# Patient Record
Sex: FEMALE | Race: WHITE | NOT HISPANIC OR LATINO
[De-identification: names, ages, dates, MRNs, and addresses within clinical notes are randomized per-mention and may not be internally consistent; named-entity substitution may affect disease eponyms.]

---
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
## 2023-01-03 ENCOUNTER — APPOINTMENT (OUTPATIENT)
Dept: PEDIATRICS | Facility: CLINIC | Age: 18
End: 2023-01-03
Payer: COMMERCIAL

## 2023-01-03 VITALS — TEMPERATURE: 97.8 F

## 2023-01-03 PROCEDURE — 99213 OFFICE O/P EST LOW 20 MIN: CPT

## 2023-01-03 NOTE — DISCUSSION/SUMMARY
[FreeTextEntry1] : May try zyrtec  and tylenol or motrin for pain as needed. Follow up PRN if sxs persist or worsen.

## 2023-01-03 NOTE — HISTORY OF PRESENT ILLNESS
[FreeTextEntry6] : Pt c/o R ear pain x 3 days\par denies fever, cough, congestion\par normal activity

## 2023-01-11 ENCOUNTER — EMERGENCY (EMERGENCY)
Facility: HOSPITAL | Age: 18
LOS: 0 days | Discharge: HOME | End: 2023-01-11
Attending: STUDENT IN AN ORGANIZED HEALTH CARE EDUCATION/TRAINING PROGRAM
Payer: COMMERCIAL

## 2023-01-11 VITALS — WEIGHT: 225.09 LBS | HEIGHT: 59 IN

## 2023-01-11 VITALS
OXYGEN SATURATION: 100 % | SYSTOLIC BLOOD PRESSURE: 120 MMHG | TEMPERATURE: 98 F | DIASTOLIC BLOOD PRESSURE: 70 MMHG | RESPIRATION RATE: 18 BRPM | HEART RATE: 85 BPM

## 2023-01-11 DIAGNOSIS — E66.9 OBESITY, UNSPECIFIED: ICD-10-CM

## 2023-01-11 DIAGNOSIS — D72.829 ELEVATED WHITE BLOOD CELL COUNT, UNSPECIFIED: ICD-10-CM

## 2023-01-11 DIAGNOSIS — R00.0 TACHYCARDIA, UNSPECIFIED: ICD-10-CM

## 2023-01-11 DIAGNOSIS — R07.89 OTHER CHEST PAIN: ICD-10-CM

## 2023-01-11 DIAGNOSIS — K21.9 GASTRO-ESOPHAGEAL REFLUX DISEASE WITHOUT ESOPHAGITIS: ICD-10-CM

## 2023-01-11 LAB
ALBUMIN SERPL ELPH-MCNC: 4 G/DL — SIGNIFICANT CHANGE UP (ref 3.3–5)
ALP SERPL-CCNC: 64 U/L — SIGNIFICANT CHANGE UP (ref 40–120)
ALT FLD-CCNC: 23 U/L — SIGNIFICANT CHANGE UP (ref 12–78)
ANION GAP SERPL CALC-SCNC: 9 MMOL/L — SIGNIFICANT CHANGE UP (ref 5–17)
AST SERPL-CCNC: 27 U/L — SIGNIFICANT CHANGE UP (ref 15–37)
BASOPHILS # BLD AUTO: 0.05 K/UL — SIGNIFICANT CHANGE UP (ref 0–0.2)
BASOPHILS NFR BLD AUTO: 0.3 % — SIGNIFICANT CHANGE UP (ref 0–2)
BILIRUB SERPL-MCNC: 0.3 MG/DL — SIGNIFICANT CHANGE UP (ref 0.2–1.2)
BUN SERPL-MCNC: 9 MG/DL — SIGNIFICANT CHANGE UP (ref 7–23)
CALCIUM SERPL-MCNC: 9.9 MG/DL — SIGNIFICANT CHANGE UP (ref 8.5–10.1)
CHLORIDE SERPL-SCNC: 106 MMOL/L — SIGNIFICANT CHANGE UP (ref 96–108)
CO2 SERPL-SCNC: 24 MMOL/L — SIGNIFICANT CHANGE UP (ref 22–31)
CREAT SERPL-MCNC: 0.74 MG/DL — SIGNIFICANT CHANGE UP (ref 0.5–1.3)
D DIMER BLD IA.RAPID-MCNC: 171 NG/ML DDU — SIGNIFICANT CHANGE UP
EGFR: 119 ML/MIN/1.73M2 — SIGNIFICANT CHANGE UP
EOSINOPHIL # BLD AUTO: 0.07 K/UL — SIGNIFICANT CHANGE UP (ref 0–0.5)
EOSINOPHIL NFR BLD AUTO: 0.5 % — SIGNIFICANT CHANGE UP (ref 0–6)
GLUCOSE SERPL-MCNC: 98 MG/DL — SIGNIFICANT CHANGE UP (ref 70–99)
HCG SERPL-ACNC: <1 MIU/ML — SIGNIFICANT CHANGE UP
HCT VFR BLD CALC: 39.1 % — SIGNIFICANT CHANGE UP (ref 34.5–45)
HGB BLD-MCNC: 12.1 G/DL — SIGNIFICANT CHANGE UP (ref 11.5–15.5)
IMM GRANULOCYTES NFR BLD AUTO: 0.3 % — SIGNIFICANT CHANGE UP (ref 0–0.9)
LIDOCAIN IGE QN: 103 U/L — SIGNIFICANT CHANGE UP (ref 73–393)
LYMPHOCYTES # BLD AUTO: 1.32 K/UL — SIGNIFICANT CHANGE UP (ref 1–3.3)
LYMPHOCYTES # BLD AUTO: 8.7 % — LOW (ref 13–44)
MCHC RBC-ENTMCNC: 23 PG — LOW (ref 27–34)
MCHC RBC-ENTMCNC: 30.9 GM/DL — LOW (ref 32–36)
MCV RBC AUTO: 74.2 FL — LOW (ref 80–100)
MONOCYTES # BLD AUTO: 0.41 K/UL — SIGNIFICANT CHANGE UP (ref 0–0.9)
MONOCYTES NFR BLD AUTO: 2.7 % — SIGNIFICANT CHANGE UP (ref 2–14)
NEUTROPHILS # BLD AUTO: 13.3 K/UL — HIGH (ref 1.8–7.4)
NEUTROPHILS NFR BLD AUTO: 87.5 % — HIGH (ref 43–77)
PLATELET # BLD AUTO: 438 K/UL — HIGH (ref 150–400)
POTASSIUM SERPL-MCNC: 4.2 MMOL/L — SIGNIFICANT CHANGE UP (ref 3.5–5.3)
POTASSIUM SERPL-SCNC: 4.2 MMOL/L — SIGNIFICANT CHANGE UP (ref 3.5–5.3)
PROT SERPL-MCNC: 9 GM/DL — HIGH (ref 6–8.3)
RBC # BLD: 5.27 M/UL — HIGH (ref 3.8–5.2)
RBC # FLD: 15.2 % — HIGH (ref 10.3–14.5)
SODIUM SERPL-SCNC: 139 MMOL/L — SIGNIFICANT CHANGE UP (ref 135–145)
TROPONIN I, HIGH SENSITIVITY RESULT: 3.68 NG/L — SIGNIFICANT CHANGE UP
WBC # BLD: 15.19 K/UL — HIGH (ref 3.8–10.5)
WBC # FLD AUTO: 15.19 K/UL — HIGH (ref 3.8–10.5)

## 2023-01-11 PROCEDURE — 85379 FIBRIN DEGRADATION QUANT: CPT

## 2023-01-11 PROCEDURE — 84702 CHORIONIC GONADOTROPIN TEST: CPT

## 2023-01-11 PROCEDURE — 83690 ASSAY OF LIPASE: CPT

## 2023-01-11 PROCEDURE — 71045 X-RAY EXAM CHEST 1 VIEW: CPT

## 2023-01-11 PROCEDURE — 99285 EMERGENCY DEPT VISIT HI MDM: CPT | Mod: 25

## 2023-01-11 PROCEDURE — 93010 ELECTROCARDIOGRAM REPORT: CPT

## 2023-01-11 PROCEDURE — 96374 THER/PROPH/DIAG INJ IV PUSH: CPT

## 2023-01-11 PROCEDURE — 85025 COMPLETE CBC W/AUTO DIFF WBC: CPT

## 2023-01-11 PROCEDURE — 99285 EMERGENCY DEPT VISIT HI MDM: CPT

## 2023-01-11 PROCEDURE — 36415 COLL VENOUS BLD VENIPUNCTURE: CPT

## 2023-01-11 PROCEDURE — 93005 ELECTROCARDIOGRAM TRACING: CPT

## 2023-01-11 PROCEDURE — 84484 ASSAY OF TROPONIN QUANT: CPT

## 2023-01-11 PROCEDURE — 80053 COMPREHEN METABOLIC PANEL: CPT

## 2023-01-11 PROCEDURE — 71045 X-RAY EXAM CHEST 1 VIEW: CPT | Mod: 26

## 2023-01-11 RX ORDER — FAMOTIDINE 10 MG/ML
1 INJECTION INTRAVENOUS
Qty: 14 | Refills: 0
Start: 2023-01-11 | End: 2023-01-17

## 2023-01-11 RX ORDER — FAMOTIDINE 10 MG/ML
20 INJECTION INTRAVENOUS ONCE
Refills: 0 | Status: COMPLETED | OUTPATIENT
Start: 2023-01-11 | End: 2023-01-11

## 2023-01-11 RX ADMIN — Medication 30 MILLILITER(S): at 18:54

## 2023-01-11 RX ADMIN — FAMOTIDINE 20 MILLIGRAM(S): 10 INJECTION INTRAVENOUS at 18:54

## 2023-01-11 NOTE — ED STATDOCS - NS ED ATTENDING STATEMENT MOD
This was a shared visit with the DAGOBERTO. I reviewed and verified the documentation and independently performed the documented:

## 2023-01-11 NOTE — ED STATDOCS - PROGRESS NOTE DETAILS
labs with mild leukocytosis, other labs WNL, trop and dimer negative, pt feeling well, pain resolved after GI cocktail, will dc home with pepcid prn, return precautions   Rosa M Duenas PA-C Patient seen and evaluated, ED attending note and orders reviewed, will continue with patient follow up and care -Rosa M Duenas PA-C

## 2023-01-11 NOTE — ED STATDOCS - OBJECTIVE STATEMENT
17 year old female with no PMHx presents to the ED c/o intermittent chest pain that began yesterday. States its a burning sensation and is burping a lot. Denies diarrhea, vomiting, fevers. Denies pain at this time but is still burping which isn't normal for the pt. Took 1 baby ASA PTA with no relief. Non smoker, denies alcohol use. Denies swelling in legs, denies hx of blood clots.

## 2023-01-11 NOTE — ED STATDOCS - CLINICAL SUMMARY MEDICAL DECISION MAKING FREE TEXT BOX
17 year old female coming in with chest pain and belching since yesterday. Vital tachy to 101, otherwise normal. exam significant for obesity, otherwise unremarkable. No risks factors for PE or ACS. Will treat as gastritis and acid reflux with meds check labs including trop and reassess. Of note, 12 lead had T wave inversions in v2 and v3 and lead 3, maybe rate related given shes tachycardic on 12 lead, however will recheck 12 lead after pts hr comes down and will screen for PE with ddimer. 17 year old female coming in with chest pain and belching since yesterday. Vital tachy to 101, otherwise normal. exam significant for obesity, otherwise unremarkable. No risks factors for PE or ACS. Will treat as gastritis and acid reflux with meds, check labs including trop and reassess. Of note, 12 lead had T wave inversions in v2 and v3 and lead 3, maybe rate related given shes tachycardic on 12 lead, however will recheck 12 lead after pts hr comes down and will screen for PE with ddimer.

## 2023-01-11 NOTE — ED ADULT NURSE NOTE - OBJECTIVE STATEMENT
Pt brought to ED from home with complaints of chest pain, 6/10. Pt received resting in chair. Pt's dad at bedside. As per Pt, pain began yesterday and has been constant; nothing makes pain better or worse. Pt denies doing or eating anything different. Pt states this has never happened before. Pt denies shortness of breath and difficulty breathing. Pt denies abdominal pain and n/v/d. No additional requests or complaints. Patient safety maintained. Will continue to monitor.

## 2023-01-11 NOTE — ED STATDOCS - CARE PLAN
1 Principal Discharge DX:	Burning chest pain  Secondary Diagnosis:	GERD (gastroesophageal reflux disease)

## 2023-01-11 NOTE — ED STATDOCS - PATIENT PORTAL LINK FT
You can access the FollowMyHealth Patient Portal offered by [facility] by registering at the following website: [email or web address]. By joining [email or web address]’s FollowMyHealth portal, you will also be able to view your health information using other applications (apps) compatible with our system.

## 2023-01-11 NOTE — ED ADULT TRIAGE NOTE - CHIEF COMPLAINT QUOTE
Patient is alert and oriented X3, presenting to the ER with c/o non-radiating chest pain. Patient reports "the chest pain began yesterday afternoon. The pain comes and goes, it feels like burning and that something is stuck. I have been burping a lot." Denies SOB, fevers, nausea, vomiting, diarrhea, abdominal pain. Took 1 baby aspirin prior to arrival.   HX: tonsils and adenoides removed (child)  Patient taken in for EKG.

## 2023-01-12 ENCOUNTER — NON-APPOINTMENT (OUTPATIENT)
Age: 18
End: 2023-01-12

## 2023-04-04 ENCOUNTER — APPOINTMENT (OUTPATIENT)
Dept: PEDIATRICS | Facility: CLINIC | Age: 18
End: 2023-04-04
Payer: COMMERCIAL

## 2023-04-04 VITALS — HEART RATE: 90 BPM | SYSTOLIC BLOOD PRESSURE: 120 MMHG | TEMPERATURE: 98.6 F | DIASTOLIC BLOOD PRESSURE: 69 MMHG

## 2023-04-04 DIAGNOSIS — H92.01 OTALGIA, RIGHT EAR: ICD-10-CM

## 2023-04-04 PROCEDURE — 99213 OFFICE O/P EST LOW 20 MIN: CPT

## 2023-04-04 NOTE — HISTORY OF PRESENT ILLNESS
[FreeTextEntry6] : Pt BIB mother for c/o dizziness with sudden movements on and off x few days\par Pt noticed when she moved quickly from bending down to standing up that she felt some dizziness for a few moments that self resolved\par denies any LOC, headaches, blurry vision, chest pain or SOB\par pt is currently on a diet which including drinking shakes through out the day and eating a larger dinner at the end of the day \par Pt is drinking adequate fluids and sleeping well at night\par denies fevers, cough, congestion, n/v/d\par

## 2023-04-04 NOTE — DISCUSSION/SUMMARY
[FreeTextEntry1] : discussed orthostatic hypotension with patient. Recommend monitoring sxs closely. Recommend moving slowly from bending down / sitting down and standing up. Recommend frequent snacks and plenty of fluids. Follow up PRN if sxs persist or worsen.

## 2023-04-12 ENCOUNTER — NON-APPOINTMENT (OUTPATIENT)
Age: 18
End: 2023-04-12

## 2023-04-13 LAB
BASOPHILS # BLD AUTO: 0.05 K/UL
BASOPHILS NFR BLD AUTO: 0.6 %
EOSINOPHIL # BLD AUTO: 0.31 K/UL
EOSINOPHIL NFR BLD AUTO: 3.9 %
FERRITIN SERPL-MCNC: 18 NG/ML
HCT VFR BLD CALC: 39.1 %
HGB BLD-MCNC: 11.5 G/DL
IMM GRANULOCYTES NFR BLD AUTO: 0.4 %
IRON SATN MFR SERPL: 6 %
IRON SERPL-MCNC: 22 UG/DL
LYMPHOCYTES # BLD AUTO: 2.46 K/UL
LYMPHOCYTES NFR BLD AUTO: 30.9 %
MAN DIFF?: NORMAL
MCHC RBC-ENTMCNC: 22.1 PG
MCHC RBC-ENTMCNC: 29.4 GM/DL
MCV RBC AUTO: 75.2 FL
MONOCYTES # BLD AUTO: 0.56 K/UL
MONOCYTES NFR BLD AUTO: 7 %
NEUTROPHILS # BLD AUTO: 4.54 K/UL
NEUTROPHILS NFR BLD AUTO: 57.2 %
PLATELET # BLD AUTO: 378 K/UL
RBC # BLD: 5.2 M/UL
RBC # FLD: 17.2 %
T4 FREE SERPL-MCNC: 1.3 NG/DL
TIBC SERPL-MCNC: 375 UG/DL
TSH SERPL-ACNC: 1.95 UIU/ML
UIBC SERPL-MCNC: 353 UG/DL
WBC # FLD AUTO: 7.95 K/UL

## 2023-07-29 ENCOUNTER — APPOINTMENT (OUTPATIENT)
Dept: PEDIATRICS | Facility: CLINIC | Age: 18
End: 2023-07-29
Payer: COMMERCIAL

## 2023-07-29 DIAGNOSIS — F41.9 ANXIETY DISORDER, UNSPECIFIED: ICD-10-CM

## 2023-07-29 PROCEDURE — 99213 OFFICE O/P EST LOW 20 MIN: CPT

## 2023-08-01 VITALS — TEMPERATURE: 98.1 F

## 2023-08-01 PROBLEM — F41.9 ANXIETY: Status: ACTIVE | Noted: 2023-08-01

## 2023-08-01 RX ORDER — AMOXICILLIN 875 MG/1
875 TABLET, FILM COATED ORAL
Qty: 10 | Refills: 0 | Status: DISCONTINUED | COMMUNITY
Start: 2023-04-05

## 2023-08-01 NOTE — DISCUSSION/SUMMARY
[FreeTextEntry1] : Monitor sxs. Avoid trigger foods. Take famotidine as needed. Follow up PRN for any new or worsening sxs.  To f/u with BH re: anxiety.

## 2023-08-01 NOTE — HISTORY OF PRESENT ILLNESS
[FreeTextEntry6] : Pt BIB mother for c/o heart burn x few days Pt reports eating pizza late at night a few nights ago and now is having heart burn  sxs improving with famotidine also reports some anxiety r/t her symptoms  denies fevers good PO / UOP normal activity

## 2023-08-21 ENCOUNTER — APPOINTMENT (OUTPATIENT)
Dept: PEDIATRICS | Facility: CLINIC | Age: 18
End: 2023-08-21
Payer: COMMERCIAL

## 2023-08-21 VITALS — HEIGHT: 59.5 IN | WEIGHT: 214 LBS | BODY MASS INDEX: 42.57 KG/M2

## 2023-08-21 VITALS — SYSTOLIC BLOOD PRESSURE: 124 MMHG | HEART RATE: 102 BPM | DIASTOLIC BLOOD PRESSURE: 80 MMHG

## 2023-08-21 DIAGNOSIS — Z00.00 ENCOUNTER FOR GENERAL ADULT MEDICAL EXAMINATION W/OUT ABNORMAL FINDINGS: ICD-10-CM

## 2023-08-21 DIAGNOSIS — Z87.898 PERSONAL HISTORY OF OTHER SPECIFIED CONDITIONS: ICD-10-CM

## 2023-08-21 PROCEDURE — 96127 BRIEF EMOTIONAL/BEHAV ASSMT: CPT

## 2023-08-21 PROCEDURE — 96160 PT-FOCUSED HLTH RISK ASSMT: CPT | Mod: 59

## 2023-08-21 PROCEDURE — 99395 PREV VISIT EST AGE 18-39: CPT

## 2023-08-21 NOTE — HISTORY OF PRESENT ILLNESS
[FreeTextEntry1] : 19 yo here for annual exam, brought in by mom lives with parents [place] in the fall, is currently a MA consumes fruits, vegetables, meat, dairy denies alcohol, drug, cigarette use denies sexual activity describes mood as stable, no concerns  sleeps 8-10 hrs, goes to dentist LMP 7/25

## 2023-08-21 NOTE — PHYSICAL EXAM

## 2023-08-22 DIAGNOSIS — D50.9 IRON DEFICIENCY ANEMIA, UNSPECIFIED: ICD-10-CM

## 2023-08-24 LAB
CHOLEST SERPL-MCNC: 170 MG/DL
FERRITIN SERPL-MCNC: 14 NG/ML
HDLC SERPL-MCNC: 48 MG/DL
IRON SATN MFR SERPL: 13 %
IRON SERPL-MCNC: 52 UG/DL
LDLC SERPL CALC-MCNC: 105 MG/DL
NONHDLC SERPL-MCNC: 121 MG/DL
TIBC SERPL-MCNC: 393 UG/DL
TRIGL SERPL-MCNC: 89 MG/DL
UIBC SERPL-MCNC: 340 UG/DL

## 2023-09-12 ENCOUNTER — APPOINTMENT (OUTPATIENT)
Dept: PEDIATRICS | Facility: CLINIC | Age: 18
End: 2023-09-12
Payer: COMMERCIAL

## 2023-09-12 VITALS — TEMPERATURE: 98.9 F

## 2023-09-12 LAB
BILIRUB UR QL STRIP: NEGATIVE
CLARITY UR: CLEAR
GLUCOSE UR-MCNC: NEGATIVE
HCG UR QL: 0.2 EU/DL
HGB UR QL STRIP.AUTO: NEGATIVE
KETONES UR-MCNC: NEGATIVE
LEUKOCYTE ESTERASE UR QL STRIP: NEGATIVE
NITRITE UR QL STRIP: NEGATIVE
PH UR STRIP: 7
PROT UR STRIP-MCNC: NEGATIVE
SP GR UR STRIP: 1.01

## 2023-09-12 PROCEDURE — 81002 URINALYSIS NONAUTO W/O SCOPE: CPT

## 2023-09-12 PROCEDURE — 99213 OFFICE O/P EST LOW 20 MIN: CPT

## 2023-09-15 LAB — BACTERIA UR CULT: NORMAL

## 2024-02-06 ENCOUNTER — APPOINTMENT (OUTPATIENT)
Age: 19
End: 2024-02-06
Payer: COMMERCIAL

## 2024-02-06 DIAGNOSIS — Z23 ENCOUNTER FOR IMMUNIZATION: ICD-10-CM

## 2024-02-06 PROCEDURE — 90471 IMMUNIZATION ADMIN: CPT

## 2024-02-06 PROCEDURE — 90746 HEPB VACCINE 3 DOSE ADULT IM: CPT

## 2024-02-06 NOTE — DISCUSSION/SUMMARY
[FreeTextEntry1] : fever free [] : The components of the vaccine(s) to be administered today are listed in the plan of care. The disease(s) for which the vaccine(s) are intended to prevent and the risks have been discussed with the caretaker.  The risks are also included in the appropriate vaccination information statements which have been provided to the patient's caregiver.  The caregiver has given consent to vaccinate.

## 2024-04-09 ENCOUNTER — APPOINTMENT (OUTPATIENT)
Dept: OBGYN | Facility: CLINIC | Age: 19
End: 2024-04-09
Payer: COMMERCIAL

## 2024-04-09 VITALS — WEIGHT: 223 LBS | SYSTOLIC BLOOD PRESSURE: 138 MMHG | DIASTOLIC BLOOD PRESSURE: 74 MMHG

## 2024-04-09 PROCEDURE — 99395 PREV VISIT EST AGE 18-39: CPT

## 2024-04-10 LAB
C TRACH RRNA SPEC QL NAA+PROBE: NOT DETECTED
N GONORRHOEA RRNA SPEC QL NAA+PROBE: NOT DETECTED
SOURCE AMPLIFICATION: NORMAL

## 2024-05-07 ENCOUNTER — APPOINTMENT (OUTPATIENT)
Dept: ANTEPARTUM | Facility: CLINIC | Age: 19
End: 2024-05-07
Payer: COMMERCIAL

## 2024-05-07 ENCOUNTER — ASOB RESULT (OUTPATIENT)
Age: 19
End: 2024-05-07

## 2024-05-07 PROCEDURE — 76856 US EXAM PELVIC COMPLETE: CPT | Mod: 59

## 2024-05-07 PROCEDURE — 76830 TRANSVAGINAL US NON-OB: CPT

## 2024-05-08 ENCOUNTER — EMERGENCY (EMERGENCY)
Facility: HOSPITAL | Age: 19
LOS: 0 days | Discharge: HOME | End: 2024-05-08
Attending: EMERGENCY MEDICINE
Payer: COMMERCIAL

## 2024-05-08 VITALS
SYSTOLIC BLOOD PRESSURE: 129 MMHG | HEART RATE: 95 BPM | OXYGEN SATURATION: 100 % | TEMPERATURE: 98 F | DIASTOLIC BLOOD PRESSURE: 66 MMHG | RESPIRATION RATE: 16 BRPM

## 2024-05-08 VITALS — HEIGHT: 59 IN | WEIGHT: 218.04 LBS

## 2024-05-08 LAB
ALBUMIN SERPL ELPH-MCNC: 3.8 G/DL — SIGNIFICANT CHANGE UP (ref 3.3–5)
ALP SERPL-CCNC: 52 U/L — SIGNIFICANT CHANGE UP (ref 40–120)
ALT FLD-CCNC: 15 U/L — SIGNIFICANT CHANGE UP (ref 12–78)
ANION GAP SERPL CALC-SCNC: 5 MMOL/L — SIGNIFICANT CHANGE UP (ref 5–17)
APPEARANCE UR: CLEAR — SIGNIFICANT CHANGE UP
AST SERPL-CCNC: 12 U/L — LOW (ref 15–37)
BASOPHILS # BLD AUTO: 0.02 K/UL — SIGNIFICANT CHANGE UP (ref 0–0.2)
BASOPHILS NFR BLD AUTO: 0.2 % — SIGNIFICANT CHANGE UP (ref 0–2)
BILIRUB SERPL-MCNC: 0.3 MG/DL — SIGNIFICANT CHANGE UP (ref 0.2–1.2)
BILIRUB UR-MCNC: NEGATIVE — SIGNIFICANT CHANGE UP
BUN SERPL-MCNC: 10 MG/DL — SIGNIFICANT CHANGE UP (ref 7–23)
CALCIUM SERPL-MCNC: 9.7 MG/DL — SIGNIFICANT CHANGE UP (ref 8.5–10.1)
CHLORIDE SERPL-SCNC: 111 MMOL/L — HIGH (ref 96–108)
CO2 SERPL-SCNC: 24 MMOL/L — SIGNIFICANT CHANGE UP (ref 22–31)
COLOR SPEC: YELLOW — SIGNIFICANT CHANGE UP
CREAT SERPL-MCNC: 0.74 MG/DL — SIGNIFICANT CHANGE UP (ref 0.5–1.3)
DIFF PNL FLD: NEGATIVE — SIGNIFICANT CHANGE UP
EGFR: 119 ML/MIN/1.73M2 — SIGNIFICANT CHANGE UP
EOSINOPHIL # BLD AUTO: 0.23 K/UL — SIGNIFICANT CHANGE UP (ref 0–0.5)
EOSINOPHIL NFR BLD AUTO: 2.8 % — SIGNIFICANT CHANGE UP (ref 0–6)
GLUCOSE SERPL-MCNC: 86 MG/DL — SIGNIFICANT CHANGE UP (ref 70–99)
GLUCOSE UR QL: NEGATIVE MG/DL — SIGNIFICANT CHANGE UP
HCT VFR BLD CALC: 41.5 % — SIGNIFICANT CHANGE UP (ref 34.5–45)
HGB BLD-MCNC: 12.9 G/DL — SIGNIFICANT CHANGE UP (ref 11.5–15.5)
IMM GRANULOCYTES NFR BLD AUTO: 0.2 % — SIGNIFICANT CHANGE UP (ref 0–0.9)
KETONES UR-MCNC: ABNORMAL MG/DL
LEUKOCYTE ESTERASE UR-ACNC: NEGATIVE — SIGNIFICANT CHANGE UP
LIDOCAIN IGE QN: 37 U/L — SIGNIFICANT CHANGE UP (ref 13–75)
LYMPHOCYTES # BLD AUTO: 1.87 K/UL — SIGNIFICANT CHANGE UP (ref 1–3.3)
LYMPHOCYTES # BLD AUTO: 23 % — SIGNIFICANT CHANGE UP (ref 13–44)
MCHC RBC-ENTMCNC: 24.6 PG — LOW (ref 27–34)
MCHC RBC-ENTMCNC: 31.1 GM/DL — LOW (ref 32–36)
MCV RBC AUTO: 79 FL — LOW (ref 80–100)
MONOCYTES # BLD AUTO: 0.56 K/UL — SIGNIFICANT CHANGE UP (ref 0–0.9)
MONOCYTES NFR BLD AUTO: 6.9 % — SIGNIFICANT CHANGE UP (ref 2–14)
NEUTROPHILS # BLD AUTO: 5.44 K/UL — SIGNIFICANT CHANGE UP (ref 1.8–7.4)
NEUTROPHILS NFR BLD AUTO: 66.9 % — SIGNIFICANT CHANGE UP (ref 43–77)
NITRITE UR-MCNC: NEGATIVE — SIGNIFICANT CHANGE UP
PH UR: 6.5 — SIGNIFICANT CHANGE UP (ref 5–8)
PLATELET # BLD AUTO: 320 K/UL — SIGNIFICANT CHANGE UP (ref 150–400)
POCT URINE PREGNANCY TEST: NEGATIVE — SIGNIFICANT CHANGE UP
POTASSIUM SERPL-MCNC: 3.9 MMOL/L — SIGNIFICANT CHANGE UP (ref 3.5–5.3)
POTASSIUM SERPL-SCNC: 3.9 MMOL/L — SIGNIFICANT CHANGE UP (ref 3.5–5.3)
PROT SERPL-MCNC: 7.7 GM/DL — SIGNIFICANT CHANGE UP (ref 6–8.3)
PROT UR-MCNC: SIGNIFICANT CHANGE UP MG/DL
RBC # BLD: 5.25 M/UL — HIGH (ref 3.8–5.2)
RBC # FLD: 13.6 % — SIGNIFICANT CHANGE UP (ref 10.3–14.5)
SODIUM SERPL-SCNC: 140 MMOL/L — SIGNIFICANT CHANGE UP (ref 135–145)
SP GR SPEC: 1.03 — SIGNIFICANT CHANGE UP (ref 1–1.03)
TSH SERPL-MCNC: 0.59 UU/ML — SIGNIFICANT CHANGE UP (ref 0.34–4.82)
UROBILINOGEN FLD QL: 1 MG/DL — SIGNIFICANT CHANGE UP (ref 0.2–1)
WBC # BLD: 8.14 K/UL — SIGNIFICANT CHANGE UP (ref 3.8–10.5)
WBC # FLD AUTO: 8.14 K/UL — SIGNIFICANT CHANGE UP (ref 3.8–10.5)

## 2024-05-08 PROCEDURE — 74177 CT ABD & PELVIS W/CONTRAST: CPT | Mod: MC

## 2024-05-08 PROCEDURE — 74177 CT ABD & PELVIS W/CONTRAST: CPT | Mod: 26,MC

## 2024-05-08 PROCEDURE — 99284 EMERGENCY DEPT VISIT MOD MDM: CPT | Mod: 25

## 2024-05-08 PROCEDURE — 87086 URINE CULTURE/COLONY COUNT: CPT

## 2024-05-08 PROCEDURE — 85025 COMPLETE CBC W/AUTO DIFF WBC: CPT

## 2024-05-08 PROCEDURE — 81025 URINE PREGNANCY TEST: CPT

## 2024-05-08 PROCEDURE — 83690 ASSAY OF LIPASE: CPT

## 2024-05-08 PROCEDURE — 99285 EMERGENCY DEPT VISIT HI MDM: CPT

## 2024-05-08 PROCEDURE — 84443 ASSAY THYROID STIM HORMONE: CPT

## 2024-05-08 PROCEDURE — 80053 COMPREHEN METABOLIC PANEL: CPT

## 2024-05-08 PROCEDURE — 36000 PLACE NEEDLE IN VEIN: CPT | Mod: XU

## 2024-05-08 PROCEDURE — 81003 URINALYSIS AUTO W/O SCOPE: CPT

## 2024-05-08 PROCEDURE — 36415 COLL VENOUS BLD VENIPUNCTURE: CPT

## 2024-05-08 RX ORDER — POLYETHYLENE GLYCOL 3350 17 G/17G
17 POWDER, FOR SOLUTION ORAL ONCE
Refills: 0 | Status: COMPLETED | OUTPATIENT
Start: 2024-05-08 | End: 2024-05-08

## 2024-05-08 RX ORDER — SODIUM CHLORIDE 9 MG/ML
1000 INJECTION INTRAMUSCULAR; INTRAVENOUS; SUBCUTANEOUS ONCE
Refills: 0 | Status: COMPLETED | OUTPATIENT
Start: 2024-05-08 | End: 2024-05-08

## 2024-05-08 RX ADMIN — POLYETHYLENE GLYCOL 3350 17 GRAM(S): 17 POWDER, FOR SOLUTION ORAL at 09:37

## 2024-05-08 RX ADMIN — SODIUM CHLORIDE 1000 MILLILITER(S): 9 INJECTION INTRAMUSCULAR; INTRAVENOUS; SUBCUTANEOUS at 09:32

## 2024-05-08 NOTE — ED PROVIDER NOTE - PHYSICAL EXAMINATION
Constitutional: NAD AAOx3  Eyes: PERRLA EOMI  Head: Normocephalic atraumatic  Mouth: MMM  Cardiac: regular rate   Resp: unlabored breathing clear b/l  GI: Abd s/nd +llq and rlq ttp no cvat  Neuro: grossly normal and intact  Skin: No visible rashes

## 2024-05-08 NOTE — ED PROVIDER NOTE - PATIENT PORTAL LINK FT
You can access the FollowMyHealth Patient Portal offered by [facility] by registering at the following website: [email or web address]. By joining [employer]’s FollowMyHealth portal, you will also be able to view your health information using other applications (apps) compatible with our system.

## 2024-05-08 NOTE — ED ADULT TRIAGE NOTE - INTERNATIONAL TRAVEL
Darren Burk MD  [id number] [facility] Clinical  Please advised patient to hold Coumadin today and tomorrow, then take 2 5 mg daily and repeat INR on Tuesday or Wednesday next week  Noble Lesch you
No

## 2024-05-08 NOTE — ED PROVIDER NOTE - NSFOLLOWUPINSTRUCTIONS_ED_ALL_ED_FT
1. return for worsening symptoms or anything concerning to you  2. take all home meds as prescribed  3. follow up with your pmd call to make an appointment  4. follow up with GI see attached  5. use miralax, enema or suppository to treat constipation    Constipation    WHAT YOU NEED TO KNOW:    Constipation is when you have hard, dry bowel movements, or you go longer than usual between bowel movements.     DISCHARGE INSTRUCTIONS:    Return to the emergency department if:     You have blood in your bowel movements.      You have a fever and abdominal pain with the constipation.    Contact your healthcare provider if:     Your constipation gets worse.       You start to vomit.      You have questions or concerns about your condition or care.    Medicines:     Medicine such as a laxative may help relax and loosen your intestines to help you have a bowel movement. Your provider may recommend you only use laxatives for a short time. Long-term use may make your bowels dependent on the medicine.      Take your medicine as directed. Contact your healthcare provider if you think your medicine is not helping or if you have side effects. Tell him of her if you are allergic to any medicine. Keep a list of the medicines, vitamins, and herbs you take. Include the amounts, and when and why you take them. Bring the list or the pill bottles to follow-up visits. Carry your medicine list with you in case of an emergency.    Relieve constipation:     A suppository may be used to help soften your bowel movements. This may make them easier to pass. A suppository is guided into your rectum through your anus.Suppository for Constipation           An enema is liquid medicine used to clear bowel movement from your rectum. The medicine is put into your rectum through your anus.Enemas         Prevent constipation:     Drink liquids as directed. You may need to drink extra liquids to help soften and move your bowels. Ask how much liquid to drink each day and which liquids are best for you.       Eat high-fiber foods. This may help decrease constipation by adding bulk to your bowel movements. High-fiber foods include fruit, vegetables, whole-grain breads and cereals, and beans. Your healthcare provider or dietitian can help you create a high-fiber meal plan. Your provider may also recommend a fiber supplement if you cannot get enough fiber from food.            Exercise regularly. Regular physical activity can help stimulate your intestines. Ask which exercises are best for you.      Schedule a time each day to have a bowel movement. This may help train your body to have regular bowel movements. Bend forward while you are on the toilet to help move the bowel movement out. Sit on the toilet for at least 10 minutes, even if you do not have a bowel movement.     Follow up with your healthcare provider as directed: Write down your questions so you remember to ask them during your visits.

## 2024-05-08 NOTE — ED ADULT TRIAGE NOTE - CHIEF COMPLAINT QUOTE
Patient presents with c/o constipation for 7 days Left sided lower abdominal pain and lower back pain.

## 2024-05-08 NOTE — ED ADULT NURSE NOTE - OBJECTIVE STATEMENT
18y female presents to the ED c/o constipation. Pt reports last bowel movement was about 10 days ago, has taken laxatives without relief, had a small bowel movement on sunday but reports her stomach feel hard, today she was experiencing low back pain, endorses LLQ abdominal pain.

## 2024-05-08 NOTE — ED PROVIDER NOTE - CLINICAL SUMMARY MEDICAL DECISION MAKING FREE TEXT BOX
18-year-old female presents the emergency department for abdominal pain and constipation.  Patient states that for the past 10 days she has not been able to have a normal bowel movement tried taking Dulcolax which caused her to have very mild small amount of liquid stool but did not have normal bowel movement now is developed lower abdominal discomfort no nausea or vomiting no dysuria also is having some back pain.  Patient saw OB/GYN yesterday had an ultrasound which was normal which I reviewed within our system and shows normal ovaries.  Exam with left lower and right lower quadrant tenderness to palpation no CVA tenderness otherwise nonfocal exam given constipation and abdominal pain will workup to rule out diverticulitis appendicitis UTI.  Patient ultrasound yesterday no signs or concern for torsion will check urine rule out UTI treat symptomatically reassess. 18-year-old female presents the emergency department for abdominal pain and constipation.  Patient states that for the past 10 days she has not been able to have a normal bowel movement tried taking Dulcolax which caused her to have very mild small amount of liquid stool but did not have normal bowel movement now is developed lower abdominal discomfort no nausea or vomiting no dysuria also is having some back pain.  Patient saw OB/GYN yesterday had an ultrasound which was normal which I reviewed within our system and shows normal ovaries.  Exam with left lower and right lower quadrant tenderness to palpation no CVA tenderness otherwise nonfocal exam given constipation and abdominal pain will workup to rule out diverticulitis appendicitis UTI.  Patient ultrasound yesterday no signs or concern for torsion will check urine rule out UTI treat symptomatically reassess.  1048 All labs imaging reviewed by myself.  Labs unremarkable CT negative for appendicitis diverticulitis or colitis.  Discussed with patient GI regimen patient follow-up with GI doctor comfortable going home will DC with follow-up and strict return precautions.

## 2024-05-08 NOTE — ED PROVIDER NOTE - CARE PROVIDER_API CALL
Jeramy Kirk  Gastroenterology  [facility], [address] [address]  Phone: [phone]  Fax: [phone]  Follow Up Time: 1-3 Days

## 2024-05-08 NOTE — ED ADULT NURSE NOTE - NSFALLUNIVINTERV_ED_ALL_ED
Bed/Stretcher in lowest position, wheels locked, appropriate side rails in place/Call bell, personal items and telephone in reach/Instruct patient to call for assistance before getting out of bed/chair/stretcher/Non-slip footwear applied when patient is off stretcher/[place] to call system/Physically safe environment - no spills, clutter or unnecessary equipment/Purposeful proactive rounding/Room/bathroom lighting operational, light cord in reach

## 2024-05-08 NOTE — ED PROVIDER NOTE - OBJECTIVE STATEMENT
18-year-old female presents the emergency department for abdominal pain and constipation.  Patient states that for the past 10 days she has not been able to have a normal bowel movement tried taking Dulcolax which caused her to have very mild small amount of liquid stool but did not have normal bowel movement now is developed lower abdominal discomfort no nausea or vomiting no dysuria also is having some back pain.  Patient saw OB/GYN yesterday had an ultrasound which was normal which I reviewed within our system and shows normal ovaries.  Exam with left lower and right lower quadrant tenderness to palpation no CVA tenderness otherwise nonfocal exam given constipation and abdominal pain will workup to rule out diverticulitis appendicitis UTI.  Patient ultrasound yesterday no signs or concern for torsion will check urine rule out UTI treat symptomatically reassess.

## 2024-05-09 DIAGNOSIS — R10.32 LEFT LOWER QUADRANT PAIN: ICD-10-CM

## 2024-05-09 DIAGNOSIS — K59.00 CONSTIPATION, UNSPECIFIED: ICD-10-CM

## 2024-05-09 DIAGNOSIS — M54.9 DORSALGIA, UNSPECIFIED: ICD-10-CM

## 2024-05-09 DIAGNOSIS — F17.200 NICOTINE DEPENDENCE, UNSPECIFIED, UNCOMPLICATED: ICD-10-CM

## 2024-05-10 ENCOUNTER — TRANSCRIPTION ENCOUNTER (OUTPATIENT)
Age: 19
End: 2024-05-10

## 2024-05-10 LAB
CULTURE RESULTS: SIGNIFICANT CHANGE UP
SPECIMEN SOURCE: SIGNIFICANT CHANGE UP

## 2024-05-20 ENCOUNTER — APPOINTMENT (OUTPATIENT)
Age: 19
End: 2024-05-20
Payer: COMMERCIAL

## 2024-05-20 VITALS — TEMPERATURE: 98.2 F | WEIGHT: 225.6 LBS

## 2024-05-20 DIAGNOSIS — Z87.898 PERSONAL HISTORY OF OTHER SPECIFIED CONDITIONS: ICD-10-CM

## 2024-05-20 DIAGNOSIS — Z01.419 ENCOUNTER FOR GYNECOLOGICAL EXAMINATION (GENERAL) (ROUTINE) W/OUT ABNORMAL FINDINGS: ICD-10-CM

## 2024-05-20 LAB
BILIRUB UR QL STRIP: NORMAL
CLARITY UR: CLEAR
COLLECTION METHOD: NORMAL
GLUCOSE UR-MCNC: NORMAL
HCG UR QL: 0.2 EU/DL
HGB UR QL STRIP.AUTO: NORMAL
KETONES UR-MCNC: NORMAL
LEUKOCYTE ESTERASE UR QL STRIP: NORMAL
NITRITE UR QL STRIP: NORMAL
PH UR STRIP: 5.5
PROT UR STRIP-MCNC: NORMAL
SP GR UR STRIP: 1.02

## 2024-05-20 PROCEDURE — 81003 URINALYSIS AUTO W/O SCOPE: CPT | Mod: QW

## 2024-05-20 PROCEDURE — 99213 OFFICE O/P EST LOW 20 MIN: CPT

## 2024-05-20 NOTE — DISCUSSION/SUMMARY
[FreeTextEntry1] : Anticipatory guidance and parent education given.  UA WNL Patient defers STD/STI testing  D/t symptoms, start abx as discussed WIll f/u with culture results, to be stopped if UTI -/ changed if not sensitive  f/u with urology if symptoms continue Follow up as needed for persistent or worsening symptoms, questions and concerns.

## 2024-05-20 NOTE — HISTORY OF PRESENT ILLNESS
[de-identified] : painful urination [FreeTextEntry6] : BIB self for pain/burning with urination x1 day. denies back pain, blood in urine, increase in frequency.  reports she did an at home UTI test yesterday that was + for leukocytes. No fever. No SOB, difficulty breathing, chest pain, cough, congestion or URI sx. No n/v/d. No headache, abdominal pain, sore throat or rash. No body aches or fatigue. Good po/uop/bm. Normal sleep and activity.

## 2024-05-20 NOTE — PHYSICAL EXAM
[NL] : soft, nontender, nondistended, normal bowel sounds, no hepatosplenomegaly [FreeTextEntry9] : no pain to palpation of lower back. no CVA tenderness

## 2024-06-03 NOTE — ED ADULT NURSE NOTE - PAIN RATING/NUMBER SCALE (0-10): ACTIVITY
0 Comment: Outside office- by patient report Detail Level: Simple Comment: Left radial dorsal hand treated with Efudex BID x 6 weeks Render Risk Assessment In Note?: no 98

## 2024-06-08 ENCOUNTER — APPOINTMENT (OUTPATIENT)
Dept: PEDIATRICS | Facility: CLINIC | Age: 19
End: 2024-06-08
Payer: COMMERCIAL

## 2024-06-08 VITALS — TEMPERATURE: 98.1 F | WEIGHT: 219.6 LBS

## 2024-06-08 LAB — S PYO AG SPEC QL IA: NORMAL

## 2024-06-08 PROCEDURE — 99213 OFFICE O/P EST LOW 20 MIN: CPT

## 2024-06-08 PROCEDURE — 87880 STREP A ASSAY W/OPTIC: CPT | Mod: QW

## 2024-06-08 NOTE — HISTORY OF PRESENT ILLNESS
[de-identified] : sore throat and vomited last night [FreeTextEntry6] : patient is a 18-year-old female bib self c/o sore throat and she vomited last night.  Patient stated that she just started the medication Zepbound last night after stopping Mounjaro back in February and wasn't sure if the dose was too high or not.  Patient denied any fever or diarrhea at this time.

## 2024-06-25 DIAGNOSIS — L29.2 PRURITUS VULVAE: ICD-10-CM

## 2024-06-25 RX ORDER — FLUCONAZOLE 150 MG/1
150 TABLET ORAL
Qty: 1 | Refills: 0 | Status: ACTIVE | COMMUNITY
Start: 2024-06-25 | End: 1900-01-01

## 2024-07-01 ENCOUNTER — APPOINTMENT (OUTPATIENT)
Dept: PEDIATRICS | Facility: CLINIC | Age: 19
End: 2024-07-01
Payer: COMMERCIAL

## 2024-07-01 VITALS — TEMPERATURE: 98.5 F | WEIGHT: 217.8 LBS

## 2024-07-01 DIAGNOSIS — J01.90 ACUTE SINUSITIS, UNSPECIFIED: ICD-10-CM

## 2024-07-01 DIAGNOSIS — R39.9 UNSPECIFIED SYMPTOMS AND SIGNS INVOLVING THE GENITOURINARY SYSTEM: ICD-10-CM

## 2024-07-01 DIAGNOSIS — Z87.09 PERSONAL HISTORY OF OTHER DISEASES OF THE RESPIRATORY SYSTEM: ICD-10-CM

## 2024-07-01 DIAGNOSIS — R11.2 NAUSEA WITH VOMITING, UNSPECIFIED: ICD-10-CM

## 2024-07-01 DIAGNOSIS — B96.89 ACUTE SINUSITIS, UNSPECIFIED: ICD-10-CM

## 2024-07-01 PROCEDURE — 99203 OFFICE O/P NEW LOW 30 MIN: CPT

## 2024-07-01 RX ORDER — DOXYCYCLINE 100 MG/1
100 TABLET, FILM COATED ORAL
Qty: 14 | Refills: 0 | Status: ACTIVE | COMMUNITY
Start: 2024-07-01 | End: 1900-01-01

## 2024-07-01 RX ORDER — TIRZEPATIDE 15 MG/.5ML
INJECTION, SOLUTION SUBCUTANEOUS
Refills: 0 | Status: ACTIVE | COMMUNITY

## 2024-08-08 ENCOUNTER — APPOINTMENT (OUTPATIENT)
Dept: PEDIATRICS | Facility: CLINIC | Age: 19
End: 2024-08-08

## 2024-08-08 PROCEDURE — 99214 OFFICE O/P EST MOD 30 MIN: CPT

## 2024-08-08 NOTE — DISCUSSION/SUMMARY
[FreeTextEntry1] : advised to hold Zmax  EBV, cbc,FLp, iron studies   sx tx  will f/u w results Name band;

## 2024-08-08 NOTE — PHYSICAL EXAM
[NL] : warm, clear [de-identified] : injected  scant exudate L side [de-identified] : tender AC nodes

## 2024-08-08 NOTE — HISTORY OF PRESENT ILLNESS
[FreeTextEntry6] : c/o tiredness past 2 wks  ST dev  3 days ago seen at   Flu, covid and rapid strep neg  TC pending  yesterday started one dose of Zmax  still w ST  and swollen neck glands  pt drinking  well  denies abd pain, v,d no fevers  pt w h/o anemia was taking iron  followed by [facility]  Management  office- rx Zepbound q wk

## 2024-08-08 NOTE — PHYSICAL EXAM
[NL] : warm, clear [de-identified] : injected  scant exudate L side [de-identified] : tender AC nodes

## 2024-08-09 ENCOUNTER — NON-APPOINTMENT (OUTPATIENT)
Age: 19
End: 2024-08-09

## 2024-08-14 ENCOUNTER — EMERGENCY (EMERGENCY)
Facility: HOSPITAL | Age: 19
LOS: 0 days | Discharge: HOME | End: 2024-08-14
Attending: EMERGENCY MEDICINE
Payer: COMMERCIAL

## 2024-08-14 VITALS
HEART RATE: 80 BPM | SYSTOLIC BLOOD PRESSURE: 123 MMHG | DIASTOLIC BLOOD PRESSURE: 83 MMHG | TEMPERATURE: 98 F | OXYGEN SATURATION: 100 % | RESPIRATION RATE: 18 BRPM

## 2024-08-14 VITALS
OXYGEN SATURATION: 99 % | SYSTOLIC BLOOD PRESSURE: 136 MMHG | WEIGHT: 212.08 LBS | RESPIRATION RATE: 18 BRPM | DIASTOLIC BLOOD PRESSURE: 81 MMHG | TEMPERATURE: 98 F | HEART RATE: 74 BPM

## 2024-08-14 LAB
ALBUMIN SERPL ELPH-MCNC: 3.8 G/DL — SIGNIFICANT CHANGE UP (ref 3.3–5)
ALP SERPL-CCNC: 79 U/L — SIGNIFICANT CHANGE UP (ref 40–120)
ALT FLD-CCNC: 66 U/L — SIGNIFICANT CHANGE UP (ref 12–78)
ANION GAP SERPL CALC-SCNC: 6 MMOL/L — SIGNIFICANT CHANGE UP (ref 5–17)
APPEARANCE UR: CLEAR — SIGNIFICANT CHANGE UP
APTT BLD: 36.8 SEC — HIGH (ref 24.5–35.6)
AST SERPL-CCNC: 25 U/L — SIGNIFICANT CHANGE UP (ref 15–37)
BASOPHILS # BLD AUTO: 0.03 K/UL — SIGNIFICANT CHANGE UP (ref 0–0.2)
BASOPHILS NFR BLD AUTO: 0.3 % — SIGNIFICANT CHANGE UP (ref 0–2)
BILIRUB SERPL-MCNC: 0.3 MG/DL — SIGNIFICANT CHANGE UP (ref 0.2–1.2)
BILIRUB UR-MCNC: NEGATIVE — SIGNIFICANT CHANGE UP
BLD GP AB SCN SERPL QL: SIGNIFICANT CHANGE UP
BUN SERPL-MCNC: 10 MG/DL — SIGNIFICANT CHANGE UP (ref 7–23)
CALCIUM SERPL-MCNC: 9.3 MG/DL — SIGNIFICANT CHANGE UP (ref 8.5–10.1)
CHLORIDE SERPL-SCNC: 110 MMOL/L — HIGH (ref 96–108)
CO2 SERPL-SCNC: 25 MMOL/L — SIGNIFICANT CHANGE UP (ref 22–31)
COLOR SPEC: YELLOW — SIGNIFICANT CHANGE UP
CREAT SERPL-MCNC: 0.81 MG/DL — SIGNIFICANT CHANGE UP (ref 0.5–1.3)
DIFF PNL FLD: NEGATIVE — SIGNIFICANT CHANGE UP
EGFR: 106 ML/MIN/1.73M2 — SIGNIFICANT CHANGE UP
EOSINOPHIL # BLD AUTO: 0.1 K/UL — SIGNIFICANT CHANGE UP (ref 0–0.5)
EOSINOPHIL NFR BLD AUTO: 1.2 % — SIGNIFICANT CHANGE UP (ref 0–6)
GLUCOSE SERPL-MCNC: 92 MG/DL — SIGNIFICANT CHANGE UP (ref 70–99)
GLUCOSE UR QL: NEGATIVE MG/DL — SIGNIFICANT CHANGE UP
HCG SERPL-ACNC: <1 MIU/ML — SIGNIFICANT CHANGE UP
HCT VFR BLD CALC: 38.4 % — SIGNIFICANT CHANGE UP (ref 34.5–45)
HGB BLD-MCNC: 12.6 G/DL — SIGNIFICANT CHANGE UP (ref 11.5–15.5)
IMM GRANULOCYTES NFR BLD AUTO: 0.2 % — SIGNIFICANT CHANGE UP (ref 0–0.9)
INR BLD: 1.38 RATIO — HIGH (ref 0.85–1.18)
KETONES UR-MCNC: NEGATIVE MG/DL — SIGNIFICANT CHANGE UP
LEUKOCYTE ESTERASE UR-ACNC: NEGATIVE — SIGNIFICANT CHANGE UP
LIDOCAIN IGE QN: 81 U/L — HIGH (ref 13–75)
LYMPHOCYTES # BLD AUTO: 3.06 K/UL — SIGNIFICANT CHANGE UP (ref 1–3.3)
LYMPHOCYTES # BLD AUTO: 35.5 % — SIGNIFICANT CHANGE UP (ref 13–44)
MCHC RBC-ENTMCNC: 25 PG — LOW (ref 27–34)
MCHC RBC-ENTMCNC: 32.8 GM/DL — SIGNIFICANT CHANGE UP (ref 32–36)
MCV RBC AUTO: 76.3 FL — LOW (ref 80–100)
MONOCYTES # BLD AUTO: 0.67 K/UL — SIGNIFICANT CHANGE UP (ref 0–0.9)
MONOCYTES NFR BLD AUTO: 7.8 % — SIGNIFICANT CHANGE UP (ref 2–14)
NEUTROPHILS # BLD AUTO: 4.74 K/UL — SIGNIFICANT CHANGE UP (ref 1.8–7.4)
NEUTROPHILS NFR BLD AUTO: 55 % — SIGNIFICANT CHANGE UP (ref 43–77)
NITRITE UR-MCNC: NEGATIVE — SIGNIFICANT CHANGE UP
PH UR: 6 — SIGNIFICANT CHANGE UP (ref 5–8)
PLATELET # BLD AUTO: 309 K/UL — SIGNIFICANT CHANGE UP (ref 150–400)
POTASSIUM SERPL-MCNC: 4.2 MMOL/L — SIGNIFICANT CHANGE UP (ref 3.5–5.3)
POTASSIUM SERPL-SCNC: 4.2 MMOL/L — SIGNIFICANT CHANGE UP (ref 3.5–5.3)
PROT SERPL-MCNC: 8.1 GM/DL — SIGNIFICANT CHANGE UP (ref 6–8.3)
PROT UR-MCNC: NEGATIVE MG/DL — SIGNIFICANT CHANGE UP
PROTHROM AB SERPL-ACNC: 15.4 SEC — HIGH (ref 9.5–13)
RBC # BLD: 5.03 M/UL — SIGNIFICANT CHANGE UP (ref 3.8–5.2)
RBC # FLD: 13.2 % — SIGNIFICANT CHANGE UP (ref 10.3–14.5)
SODIUM SERPL-SCNC: 141 MMOL/L — SIGNIFICANT CHANGE UP (ref 135–145)
SP GR SPEC: 1 — SIGNIFICANT CHANGE UP (ref 1–1.03)
UROBILINOGEN FLD QL: 0.2 MG/DL — SIGNIFICANT CHANGE UP (ref 0.2–1)
WBC # BLD: 8.62 K/UL — SIGNIFICANT CHANGE UP (ref 3.8–10.5)
WBC # FLD AUTO: 8.62 K/UL — SIGNIFICANT CHANGE UP (ref 3.8–10.5)

## 2024-08-14 PROCEDURE — 83690 ASSAY OF LIPASE: CPT

## 2024-08-14 PROCEDURE — 85610 PROTHROMBIN TIME: CPT

## 2024-08-14 PROCEDURE — 36000 PLACE NEEDLE IN VEIN: CPT | Mod: XU

## 2024-08-14 PROCEDURE — 85025 COMPLETE CBC W/AUTO DIFF WBC: CPT

## 2024-08-14 PROCEDURE — 86901 BLOOD TYPING SEROLOGIC RH(D): CPT

## 2024-08-14 PROCEDURE — 80053 COMPREHEN METABOLIC PANEL: CPT

## 2024-08-14 PROCEDURE — 99284 EMERGENCY DEPT VISIT MOD MDM: CPT | Mod: 25

## 2024-08-14 PROCEDURE — 86900 BLOOD TYPING SEROLOGIC ABO: CPT

## 2024-08-14 PROCEDURE — 84702 CHORIONIC GONADOTROPIN TEST: CPT

## 2024-08-14 PROCEDURE — 74177 CT ABD & PELVIS W/CONTRAST: CPT | Mod: 26,MC

## 2024-08-14 PROCEDURE — 81003 URINALYSIS AUTO W/O SCOPE: CPT

## 2024-08-14 PROCEDURE — 85730 THROMBOPLASTIN TIME PARTIAL: CPT

## 2024-08-14 PROCEDURE — 36415 COLL VENOUS BLD VENIPUNCTURE: CPT

## 2024-08-14 PROCEDURE — 74177 CT ABD & PELVIS W/CONTRAST: CPT | Mod: MC

## 2024-08-14 PROCEDURE — 99285 EMERGENCY DEPT VISIT HI MDM: CPT

## 2024-08-14 PROCEDURE — 86850 RBC ANTIBODY SCREEN: CPT

## 2024-08-14 RX ORDER — BACTERIOSTATIC SODIUM CHLORIDE 0.9 %
1000 VIAL (ML) INJECTION ONCE
Refills: 0 | Status: DISCONTINUED | OUTPATIENT
Start: 2024-08-14 | End: 2024-08-14

## 2024-08-14 RX ORDER — BACTERIOSTATIC SODIUM CHLORIDE 0.9 %
1000 VIAL (ML) INJECTION ONCE
Refills: 0 | Status: COMPLETED | OUTPATIENT
Start: 2024-08-14 | End: 2024-08-14

## 2024-08-14 RX ADMIN — Medication 1000 MILLILITER(S): at 21:38

## 2024-08-14 NOTE — ED STATDOCS - WET READ LAUNCH FT
Patient would like to know if she could get \"the continuous\" birth control - where she only gets her period 4 times a year.  Please call to advise.    [facility]    
Taped message the amount request was changed.  
There are no Wet Read(s) to document.

## 2024-08-14 NOTE — ED STATDOCS - PROGRESS NOTE DETAILS
PA note: All labwork/radiology results discussed in detail with patient. Patient re-examined and re-evaluated. Patient feels much better at this time. ED evaluation, Diagnosis and management discussed with the patient in detail. Workup results discussed with ED attending, OK to dc home. Close GI/MD follow up encouraged, patient sees dr Kirk, and will f/u. Strict ED return instructions discussed in detail and patient given the opportunity to ask any questions about their discharge diagnosis and instructions. Patient verbalized understanding. ~ Bala Downs PA-C PA: Patient is a 19 year old female with PMHx of IBS, recently diagnosed with MONO 1 week ago, who presents to ED c/o LLQ pain x 2 days. DENIES n/v/d, fever or chills. ~Bala Downs PA-C

## 2024-08-14 NOTE — ED STATDOCS - CLINICAL SUMMARY MEDICAL DECISION MAKING FREE TEXT BOX
19 year old female with current viral mono illness, possibly slight enteritis or colitis, or bleeding from internal hemorrhoids due to history of ibs,  plan: labs , CT to rule out enteritis, colitis, diverticulitis and evaluate size of the spleen.

## 2024-08-14 NOTE — ED ADULT NURSE NOTE - CHIEF COMPLAINT QUOTE
Patient presents to the ER with complaints of having left side abdominal pain and bloody stool. Patient states she had tested positive for Mono last Friday. Patient also reports elevated liver enzymes.

## 2024-08-14 NOTE — ED STATDOCS - OBJECTIVE STATEMENT
19 year old female with history of IBS presents to ED c/o left sided abdominal pain, and small amount of blood noted in stool today. patient was diagnosed with mono one week ago. patient states she had a sore throat 2 weeks ago which lasted 1 week. patient states outpatient blood work showed mono. patient states they had 1 week of achy LUQ abdominal pain. patient notes avoiding exercise and sports as per her pediatricians request in order to avoid spleen enlargement. patient notes her liver enzymes were high in outpatient blood work. denies fever, no sore throat for several days, normal po intake, -constipation or hemorrhoids. patient notes this is their first occurrence of blood in their stool. denies hematuria or vaginal bleeding.

## 2024-08-14 NOTE — ED ADULT TRIAGE NOTE - CHIEF COMPLAINT QUOTE
Encounter addended by: Mihaela Smiley on: 11/8/2023 7:09 AM   Actions taken: SmartForm saved, Flowsheet accepted
Patient presents to the ER with complaints of having left side abdominal pain and bloody stool. Patient states she had tested positive for Mono last Friday. Patient also reports elevated liver enzymes.

## 2024-08-14 NOTE — ED ADULT NURSE NOTE - OBJECTIVE STATEMENT
19y female presented to the ED with complaints of left sided lower abdominal pain. Pt diagnosed with moo Friday. Pt denies soar throat. Pt endorses fatigue. Pt states she had red blood in her stool.

## 2024-08-14 NOTE — ED STATDOCS - PATIENT PORTAL LINK FT
You can access the FollowMyHealth Patient Portal offered by [facility] by registering at the following website: [email or web address]. By joining [employer]’s FollowMyHealth portal, you will also be able to view your health information using other applications (apps) compatible with our system.

## 2024-08-14 NOTE — ED STATDOCS - NSFOLLOWUPINSTRUCTIONS_ED_ALL_ED_FT
Infectious Mononucleosis  Infectious mononucleosis is also called mono. It's an infection that can affect many areas of your body, such as your throat, lymph glands, liver, and spleen.    Mono is contagious. This means it spreads from person to person. In most cases, it goes away on its own in 2–4 weeks. In rare cases, mono can get worse and last longer.    What are the causes?  Mono is often caused by the Jay–Barr virus (EBV). You can get the virus by:  Coming in contact with the saliva or other body fluids of a person who has mono. This can happen through:  Kissing.  Sex.  Coughing.  Sneezing.  Sharing forks, knives, spoons, or cups with a person who has mono.  Receiving blood from a person who has mono.  Getting an organ from a person who has mono.  What increases the risk?  You are more likely to get mono if you're 15–24 years old.    What are the signs or symptoms?  Lymph glands in a person's neck. One image has normal lymph glands and one has swollen, red lymph glands.  Symptoms of mono often show up about 4–6 weeks after you're infected.    Common symptoms include:  Sore throat.  Headache.  Feeling very tired.  Muscle aches.  Swollen glands.  Fever.  Not wanting to eat as much as normal.  Rash.  Other symptoms include:  A liver or spleen that's larger than normal.  Nausea.  Vomiting.  Pain in your belly.  How is this diagnosed?  Mono may be diagnosed based on your medical history, symptoms, and a physical exam. Your health care provider may do blood tests to see if you have mono.    How is this treated?  There's no cure for mono. But in most cases, it goes away on its own with time. Treatment can help you feel better while you heal. It may include:  Drinking lots of fluids.  Getting lots of rest.  Taking medicines. This may include medicines to treat swelling.  If your symptoms are very bad, you may need to be treated in a hospital.    Follow these instructions at home:  Medicines    Take your medicines only as told by your provider.  Do not take the antibiotics ampicillin or amoxicillin. They may cause a rash.  If you're under 18, do not take aspirin. Aspirin is linked to Reye's syndrome in children.  Activity    Rest as needed.  Do not do these things until your provider says they're safe for you:  Contact sports. You may need to wait at least 1 month before you play sports.  Exercises that take a lot of energy.  Heavy lifting.  Slowly go back to your normal activities after your fever is gone, or when your provider says you can. Be sure to rest when you get tired.  General instructions    Do not kiss other people until your provider says you can.  Do not share forks, spoons, knives, or cups with other people until your provider says you can.  Drink enough fluid to keep your pee pale yellow.  Do not drink alcohol.  If you have a sore throat:  Swish and gargle with salt water and then spit it out. To make salt water, add ½–1 tsp (3–6 g) of salt to 1 cup (237 mL) of warm water. Mix well.  Eat soft foods. Cold foods like ice cream or ice pops can soothe a sore throat.  Try sucking on hard candy.  How is this prevented?  Washing hands with soap and water.  Stay away from people who have mono. Even if the person doesn't feel sick, they can still spread the virus.  Try not to share forks, spoons, knives, cups, or toothbrushes with others.  Wash your hands often with soap and water for at least 20 seconds. If you can't use soap and water, use hand .  Use the inside of your elbow to cover your mouth when you cough or sneeze.  Where to find more information  Centers for Disease Control and Prevention (CDC): cdc.gov  Contact a health care provider if:  Your fever isn't gone after 10 days.  You have swollen lymph glands that aren't back to normal after 4 weeks.  Your activity level isn't back to normal after 2 months.  Your skin or the white parts of your eyes turn yellow. This is a condition called jaundice.  You have trouble pooping, or constipation. You may:  Poop fewer times in a week than normal.  Have poop that is dry, hard, or bigger than normal.  Get help right away if:  You can't stop vomiting.  You're drooling or you have trouble swallowing.  You're dehydrated. This is when you don't have enough water in your body. Signs may include:  Weakness.  Pale skin.  Sunken eyes or dry mouth.  Fast breathing or heartbeat.  You have trouble breathing.  You have a stiff neck or a very bad headache.  You have very bad pain in your belly or shoulder.  You are confused or have trouble with balance.  You have a seizure.  Your nose or gums start to bleed.  These symptoms may be an emergency. Call 911 right away.  Do not wait to see if the symptoms will go away.  Do not drive yourself to the hospital.  This information is not intended to replace advice given to you by your health care provider. Make sure you discuss any questions you have with your health care provider.

## 2024-08-19 ENCOUNTER — APPOINTMENT (OUTPATIENT)
Dept: OBGYN | Facility: CLINIC | Age: 19
End: 2024-08-19

## 2024-08-22 ENCOUNTER — APPOINTMENT (OUTPATIENT)
Dept: PEDIATRICS | Facility: CLINIC | Age: 19
End: 2024-08-22
Payer: COMMERCIAL

## 2024-08-22 VITALS — TEMPERATURE: 98.8 F

## 2024-08-22 DIAGNOSIS — Z87.09 PERSONAL HISTORY OF OTHER DISEASES OF THE RESPIRATORY SYSTEM: ICD-10-CM

## 2024-08-22 DIAGNOSIS — Z09 ENCOUNTER FOR FOLLOW-UP EXAMINATION AFTER COMPLETED TREATMENT FOR CONDITIONS OTHER THAN MALIGNANT NEOPLASM: ICD-10-CM

## 2024-08-22 DIAGNOSIS — R53.83 OTHER FATIGUE: ICD-10-CM

## 2024-08-22 DIAGNOSIS — B27.80 OTHER INFECTIOUS MONONUCLEOSIS W/OUT COMPLICATION: ICD-10-CM

## 2024-08-22 DIAGNOSIS — L29.2 PRURITUS VULVAE: ICD-10-CM

## 2024-08-22 DIAGNOSIS — D50.9 IRON DEFICIENCY ANEMIA, UNSPECIFIED: ICD-10-CM

## 2024-08-22 PROCEDURE — 99213 OFFICE O/P EST LOW 20 MIN: CPT

## 2024-08-22 NOTE — HISTORY OF PRESENT ILLNESS
[FreeTextEntry6] : pt here for f/u mono  ST improved no fevers  was seen at  last week due to L sided back pain and noted blood in toilet x 1 had CT scan- minimal  spleen enlargement  labs wnl  liver enzymes retuned to nl  no further blood noted   back pain improving yet  feels tires but sleeping well   eating well  taking Fe supplements for low iron

## 2024-08-22 NOTE — DISCUSSION/SUMMARY
[FreeTextEntry1] : resolving mono   + anemia  disc w pt will repeat a iron studies  in 2-3 mos  continue w Fe supplements may resume work activities, no contact sports discussed

## 2024-09-03 ENCOUNTER — APPOINTMENT (OUTPATIENT)
Age: 19
End: 2024-09-03
Payer: COMMERCIAL

## 2024-09-03 VITALS
BODY MASS INDEX: 41.77 KG/M2 | DIASTOLIC BLOOD PRESSURE: 78 MMHG | HEART RATE: 79 BPM | SYSTOLIC BLOOD PRESSURE: 110 MMHG | WEIGHT: 210 LBS | HEIGHT: 59.5 IN

## 2024-09-03 DIAGNOSIS — Z23 ENCOUNTER FOR IMMUNIZATION: ICD-10-CM

## 2024-09-03 DIAGNOSIS — R10.9 UNSPECIFIED ABDOMINAL PAIN: ICD-10-CM

## 2024-09-03 DIAGNOSIS — Z00.00 ENCOUNTER FOR GENERAL ADULT MEDICAL EXAMINATION W/OUT ABNORMAL FINDINGS: ICD-10-CM

## 2024-09-03 DIAGNOSIS — F41.9 ANXIETY DISORDER, UNSPECIFIED: ICD-10-CM

## 2024-09-03 PROCEDURE — 90715 TDAP VACCINE 7 YRS/> IM: CPT

## 2024-09-03 PROCEDURE — 90686 IIV4 VACC NO PRSV 0.5 ML IM: CPT

## 2024-09-03 PROCEDURE — 96160 PT-FOCUSED HLTH RISK ASSMT: CPT | Mod: 59

## 2024-09-03 PROCEDURE — 90472 IMMUNIZATION ADMIN EACH ADD: CPT

## 2024-09-03 PROCEDURE — 90471 IMMUNIZATION ADMIN: CPT

## 2024-09-03 PROCEDURE — 96127 BRIEF EMOTIONAL/BEHAV ASSMT: CPT

## 2024-09-03 PROCEDURE — 99395 PREV VISIT EST AGE 18-39: CPT | Mod: 25

## 2024-09-03 NOTE — RISK ASSESSMENT
[Little interest or pleasure doing things] : 1) Little interest or pleasure doing things [Feeling down, depressed, or hopeless] : 2) Feeling down, depressed, or hopeless [0] : 2) Feeling down, depressed, or hopeless: Not at all (0) [PHQ-9 Negative - No further assessment needed] : PHQ-9 Negative - No further assessment needed [No Increased risk of SCA or SCD] : No Increased risk of SCA or SCD    [Yes] : Patient consents to screening. [Have you ever fainted, passed out or had an unexplained seizure suddenly and without warning, especially during exercise or in response] : Have you ever fainted, passed out or had an unexplained seizure suddenly and without warning, especially during exercise or in response to sudden loud noises such as doorbells, alarm clocks and ringing telephones? No [Have you ever had exercise-related chest pain or shortness of breath?] : Have you ever had exercise-related chest pain or shortness of breath? No [Has anyone in your immediate family (parents, grandparents, siblings) or other more distant relatives (aunts, uncles, cousins)  of heart] : Has anyone in your immediate family (parents, grandparents, siblings) or other more distant relatives (aunts, uncles, cousins)  of heart problems or had an unexpected sudden death before age 50 (This would include unexpected drownings, unexplained car accidents in which the relative was driving or sudden infant death syndrome.)? No [Are you related to anyone with hypertrophic cardiomyopathy or hypertrophic obstructive cardiomyopathy, Marfan syndrome, arrhythmogenic] : Are you related to anyone with hypertrophic cardiomyopathy or hypertrophic obstructive cardiomyopathy, Marfan syndrome, arrhythmogenic right ventricular cardiomyopathy, long QT syndrome, short QT syndrome, Brugada syndrome or catecholaminergic polymorphic ventricular tachycardia, or anyone younger than 50 years with a pacemaker or implantable defibrillator? No

## 2024-09-03 NOTE — DISCUSSION/SUMMARY
[] : The components of the vaccine(s) to be administered today are listed in the plan of care. The disease(s) for which the vaccine(s) are intended to prevent and the risks have been discussed with the caretaker.  The risks are also included in the appropriate vaccination information statements which have been provided to the patient's caregiver.  The caregiver has given consent to vaccinate. [FreeTextEntry1] : Continue balanced diet with all food groups.  Brush teeth twice a day with toothbrush. Recommend visit to dentist.  Maintain consistent daily routines and sleep schedule.  Personal hygiene, puberty, and sexual health reviewed.  Risky behaviors assessed. School discussed.  Limit screen time to no more than 2 hours per day. Encourage physical activity. Labs as discussed US now to r/o splenic or liver enlargement  Anxiety discussed- I will reach out to in office LCSW for f/u  Tdap and flu administered HPV discussed and deferred Return 1 year for routine well child check.

## 2024-09-03 NOTE — HISTORY OF PRESENT ILLNESS
[Needs Immunizations] : needs immunizations [Normal] : normal [LMP: _____] : LMP: [unfilled] [Tampon Use] : tampon use [Eats meals with family] : eats meals with family [Has family members/adults to turn to for help] : has family members/adults to turn to for help [Is permitted and is able to make independent decisions] : Is permitted and is able to make independent decisions [Normal Performance] : normal performance [Normal Behavior/Attention] : normal behavior/attention [Normal Homework] : normal homework [Eats regular meals including adequate fruits and vegetables] : eats regular meals including adequate fruits and vegetables [Drinks non-sweetened liquids] : drinks non-sweetened liquids  [Calcium source] : calcium source [Has friends] : has friends [At least 1 hour of physical activity a day] : at least 1 hour of physical activity a day [Has interests/participates in community activities/volunteers] : has interests/participates in community activities/volunteers. [Uses drugs] : uses drugs  [Exposure to drugs] : exposure to drugs [No] : No cigarette smoke exposure [Uses safety belts/safety equipment] : uses safety belts/safety equipment  [Has peer relationships free of violence] : has peer relationships free of violence [Yes] : Patient has had sexual intercourse. [HIV Screening Declined] : HIV Screening Declined [Has ways to cope with stress] : has ways to cope with stress [Displays self-confidence] : displays self-confidence [Gets depressed, anxious, or irritable/has mood swings] : gets depressed, anxious, or irritable/has mood swings [With Teen] : teen [NO] : No [Irregular menses] : no irregular menses [Heavy Bleeding] : no heavy bleeding [Painful Cramps] : no painful cramps [Hirsutism] : no hirsutism [Acne] : no acne [Sleep Concerns] : no sleep concerns [Has concerns about body or appearance] : does not have concerns about body or appearance [Screen time (except homework) less than 2 hours a day] : no screen time (except homework) less than 2 hours a day [Uses electronic nicotine delivery system] : does not use electronic nicotine delivery system [Exposure to electronic nicotine delivery system] : no exposure to electronic nicotine delivery system [Uses tobacco] : does not use tobacco [Exposure to tobacco] : no exposure to tobacco [Drinks alcohol] : does not drink alcohol [Exposure to alcohol] : no exposure to alcohol [Impaired/distracted driving] : no impaired/distracted driving [Has problems with sleep] : does not have problems with sleep [Has thought about hurting self or considered suicide] : has not thought about hurting self or considered suicide [de-identified] : currently taking pre reqs for nursing school [de-identified] : has increased exercise/ healthy meal choices since starting zepbound  [de-identified] : STD/STI testing declined  [FreeTextEntry1] : Patient currently in recovery from mono. Reports symptoms are much improved but she intermittently experiences LUQ pain/ feels slightly more tired than usual  Reports family history of thyroid CA  Reports increasing feelings of generalized anxiety  Patient is taking daily iron for deficiency

## 2024-09-05 ENCOUNTER — OUTPATIENT (OUTPATIENT)
Dept: OUTPATIENT SERVICES | Facility: HOSPITAL | Age: 19
LOS: 1 days | End: 2024-09-05
Payer: COMMERCIAL

## 2024-09-05 ENCOUNTER — APPOINTMENT (OUTPATIENT)
Dept: ULTRASOUND IMAGING | Facility: CLINIC | Age: 19
End: 2024-09-05
Payer: COMMERCIAL

## 2024-09-05 DIAGNOSIS — B27.80 OTHER INFECTIOUS MONONUCLEOSIS WITHOUT COMPLICATION: ICD-10-CM

## 2024-09-05 DIAGNOSIS — R10.9 UNSPECIFIED ABDOMINAL PAIN: ICD-10-CM

## 2024-09-05 PROCEDURE — 76700 US EXAM ABDOM COMPLETE: CPT | Mod: 26

## 2024-09-05 PROCEDURE — 76700 US EXAM ABDOM COMPLETE: CPT

## 2024-09-06 ENCOUNTER — NON-APPOINTMENT (OUTPATIENT)
Age: 19
End: 2024-09-06

## 2024-09-09 ENCOUNTER — TRANSCRIPTION ENCOUNTER (OUTPATIENT)
Age: 19
End: 2024-09-09

## 2024-09-10 LAB
ALBUMIN SERPL ELPH-MCNC: 4.5 G/DL
ALP BLD-CCNC: 56 U/L
ALT SERPL-CCNC: 9 U/L
ANION GAP SERPL CALC-SCNC: 14 MMOL/L
APPEARANCE: CLEAR
AST SERPL-CCNC: 18 U/L
BASOPHILS # BLD AUTO: 0.04 K/UL
BASOPHILS NFR BLD AUTO: 0.5 %
BILIRUB SERPL-MCNC: 0.2 MG/DL
BILIRUBIN URINE: NEGATIVE
BLOOD URINE: NEGATIVE
BUN SERPL-MCNC: 6 MG/DL
CALCIUM SERPL-MCNC: 9.9 MG/DL
CHLORIDE SERPL-SCNC: 104 MMOL/L
CHOLEST SERPL-MCNC: 169 MG/DL
CO2 SERPL-SCNC: 20 MMOL/L
COLOR: YELLOW
CREAT SERPL-MCNC: 0.63 MG/DL
EGFR: 129 ML/MIN/1.73M2
EOSINOPHIL # BLD AUTO: 0.23 K/UL
EOSINOPHIL NFR BLD AUTO: 2.9 %
GLUCOSE QUALITATIVE U: NEGATIVE MG/DL
GLUCOSE SERPL-MCNC: 111 MG/DL
HCT VFR BLD CALC: 39.8 %
HCV AB SER QL: NONREACTIVE
HCV S/CO RATIO: 0.1 S/CO
HDLC SERPL-MCNC: 45 MG/DL
HGB BLD-MCNC: 12.1 G/DL
IMM GRANULOCYTES NFR BLD AUTO: 0.2 %
KETONES URINE: NEGATIVE MG/DL
LDLC SERPL CALC-MCNC: 105 MG/DL
LEUKOCYTE ESTERASE URINE: NEGATIVE
LYMPHOCYTES # BLD AUTO: 2.48 K/UL
LYMPHOCYTES NFR BLD AUTO: 30.7 %
MAN DIFF?: NORMAL
MCHC RBC-ENTMCNC: 24.7 PG
MCHC RBC-ENTMCNC: 30.4 GM/DL
MCV RBC AUTO: 81.4 FL
MONOCYTES # BLD AUTO: 0.33 K/UL
MONOCYTES NFR BLD AUTO: 4.1 %
NEUTROPHILS # BLD AUTO: 4.97 K/UL
NEUTROPHILS NFR BLD AUTO: 61.6 %
NITRITE URINE: NEGATIVE
NONHDLC SERPL-MCNC: 124 MG/DL
PH URINE: 7
PLATELET # BLD AUTO: 314 K/UL
POTASSIUM SERPL-SCNC: 4.1 MMOL/L
PROT SERPL-MCNC: 7.2 G/DL
PROTEIN URINE: NEGATIVE MG/DL
RBC # BLD: 4.89 M/UL
RBC # FLD: 14.2 %
SODIUM SERPL-SCNC: 139 MMOL/L
SPECIFIC GRAVITY URINE: 1
T4 FREE SERPL-MCNC: 1.3 NG/DL
TRIGL SERPL-MCNC: 105 MG/DL
TSH SERPL-ACNC: 0.51 UIU/ML
UROBILINOGEN URINE: 0.2 MG/DL
WBC # FLD AUTO: 8.07 K/UL

## 2024-09-13 ENCOUNTER — APPOINTMENT (OUTPATIENT)
Dept: GASTROENTEROLOGY | Facility: CLINIC | Age: 19
End: 2024-09-13
Payer: COMMERCIAL

## 2024-09-13 VITALS
SYSTOLIC BLOOD PRESSURE: 118 MMHG | DIASTOLIC BLOOD PRESSURE: 70 MMHG | HEIGHT: 59 IN | WEIGHT: 210 LBS | BODY MASS INDEX: 42.33 KG/M2

## 2024-09-13 DIAGNOSIS — R10.9 UNSPECIFIED ABDOMINAL PAIN: ICD-10-CM

## 2024-09-13 DIAGNOSIS — Z87.19 PERSONAL HISTORY OF OTHER DISEASES OF THE DIGESTIVE SYSTEM: ICD-10-CM

## 2024-09-13 DIAGNOSIS — K80.20 CALCULUS OF GALLBLADDER W/OUT CHOLECYSTITIS W/OUT OBSTRUCTION: ICD-10-CM

## 2024-09-13 DIAGNOSIS — K59.00 CONSTIPATION, UNSPECIFIED: ICD-10-CM

## 2024-09-13 PROCEDURE — 99203 OFFICE O/P NEW LOW 30 MIN: CPT

## 2024-09-13 NOTE — HISTORY OF PRESENT ILLNESS
[FreeTextEntry1] : Patricia Finley is a 19 F who was referred for evaluation of abdominal pain and findings of gallstones on US abdomen. She reports that she has on and off pain, worse when eating fatty foods. Feels as though there is a squeezing discomfort in her mid abdomen. She reports that she has been started on a GLP medication which she is unsure if this is worsening her symptoms. She has never had an endoscopic evaluation in the past. Normal LFTs seen and no concern for biliary obstruction at this time. She has a history of constipation, and anal fissure. She is not using any medications currently. She did have one episode of bleeding but attributes this to her fissure. Some nausea, no vomiting. No issues with diarrhea.

## 2024-09-13 NOTE — REVIEW OF SYSTEMS
[Abdominal Pain] : abdominal pain [Constipation] : constipation [Heartburn] : heartburn [Bleeding] : bleeding [Fever] : no fever [Chills] : no chills [Feeling Tired] : not feeling tired [Sore Throat] : no sore throat [Hoarseness] : no hoarseness [Chest Pain] : no chest pain [Palpitations] : no palpitations [Shortness Of Breath] : no shortness of breath [Cough] : no cough [SOB on Exertion] : no shortness of breath during exertion [Vomiting] : no vomiting [Diarrhea] : no diarrhea [Bloating (gassiness)] : no bloating

## 2024-09-13 NOTE — ASSESSMENT
[FreeTextEntry1] : 19 F presenting for evaluation for abdominal pain and found to have gallstones on imaging.   1. Abdominal Pain: ddx includes symptomatic cholelithiasis, could also have dyspepsia 2/2 GLP medication -Recommend holding GLP until endoscopic evaluation - will biopsy for H. pylori -Will also plan to refer to surgery, if no improvement after GLP held and EGD negative, may benefit from cholecystectomy  2. Chronic Constipation with hx anal fissures. Did have one episode of rectal bleeding. Never had colonoscopy -Offered colonoscopy but patient declined -Rec miralax Daily for now to see if this would help -If ongoing bleeding instructed to call office, will re-discuss colonoscopy at that time.

## 2024-09-13 NOTE — PHYSICAL EXAM
[Alert] : alert [Normal Voice/Communication] : normal voice/communication [Healthy Appearing] : healthy appearing [No Acute Distress] : no acute distress [Obese (BMI >= 30)] : obese (BMI >= 30) [Sclera] : the sclera and conjunctiva were normal [Normal Appearance] : the appearance of the neck was normal [No Neck Mass] : no neck mass was observed [No Respiratory Distress] : no respiratory distress [No Acc Muscle Use] : no accessory muscle use [Respiration, Rhythm And Depth] : normal respiratory rhythm and effort [Heart Rate And Rhythm] : heart rate was normal and rhythm regular [Normal S1, S2] : normal S1 and S2 [Abdomen Tenderness] : non-tender [No Masses] : no abdominal mass palpated [Oriented To Time, Place, And Person] : oriented to person, place, and time

## 2024-09-26 ENCOUNTER — RESULT REVIEW (OUTPATIENT)
Age: 19
End: 2024-09-26

## 2024-09-26 ENCOUNTER — APPOINTMENT (OUTPATIENT)
Dept: GASTROENTEROLOGY | Facility: AMBULATORY MEDICAL SERVICES | Age: 19
End: 2024-09-26
Payer: COMMERCIAL

## 2024-09-26 PROCEDURE — 43239 EGD BIOPSY SINGLE/MULTIPLE: CPT

## 2024-12-12 ENCOUNTER — OUTPATIENT (OUTPATIENT)
Dept: OUTPATIENT SERVICES | Facility: HOSPITAL | Age: 19
LOS: 1 days | End: 2024-12-12
Payer: COMMERCIAL

## 2024-12-12 ENCOUNTER — APPOINTMENT (OUTPATIENT)
Dept: ULTRASOUND IMAGING | Facility: CLINIC | Age: 19
End: 2024-12-12
Payer: COMMERCIAL

## 2024-12-12 ENCOUNTER — NON-APPOINTMENT (OUTPATIENT)
Age: 19
End: 2024-12-12

## 2024-12-12 DIAGNOSIS — Z00.8 ENCOUNTER FOR OTHER GENERAL EXAMINATION: ICD-10-CM

## 2024-12-12 PROCEDURE — 93971 EXTREMITY STUDY: CPT

## 2024-12-12 PROCEDURE — 93971 EXTREMITY STUDY: CPT | Mod: 26,RT

## 2024-12-26 ENCOUNTER — NON-APPOINTMENT (OUTPATIENT)
Age: 19
End: 2024-12-26

## 2024-12-27 ENCOUNTER — NON-APPOINTMENT (OUTPATIENT)
Age: 19
End: 2024-12-27

## 2025-01-08 ENCOUNTER — APPOINTMENT (OUTPATIENT)
Dept: INTERNAL MEDICINE | Facility: CLINIC | Age: 20
End: 2025-01-08
Payer: COMMERCIAL

## 2025-01-08 VITALS
OXYGEN SATURATION: 99 % | HEIGHT: 59 IN | SYSTOLIC BLOOD PRESSURE: 120 MMHG | BODY MASS INDEX: 0.56 KG/M2 | HEART RATE: 86 BPM | RESPIRATION RATE: 15 BRPM | DIASTOLIC BLOOD PRESSURE: 80 MMHG | TEMPERATURE: 98.2 F | WEIGHT: 2.81 LBS

## 2025-01-08 DIAGNOSIS — Z72.0 TOBACCO USE: ICD-10-CM

## 2025-01-08 DIAGNOSIS — Z00.00 ENCOUNTER FOR GENERAL ADULT MEDICAL EXAMINATION W/OUT ABNORMAL FINDINGS: ICD-10-CM

## 2025-01-08 DIAGNOSIS — D50.9 IRON DEFICIENCY ANEMIA, UNSPECIFIED: ICD-10-CM

## 2025-01-08 PROCEDURE — 99385 PREV VISIT NEW AGE 18-39: CPT

## 2025-01-08 PROCEDURE — 99395 PREV VISIT EST AGE 18-39: CPT

## 2025-01-10 ENCOUNTER — NON-APPOINTMENT (OUTPATIENT)
Age: 20
End: 2025-01-10

## 2025-01-10 DIAGNOSIS — R79.89 OTHER SPECIFIED ABNORMAL FINDINGS OF BLOOD CHEMISTRY: ICD-10-CM

## 2025-01-10 LAB
25(OH)D3 SERPL-MCNC: 20.2 NG/ML
ALBUMIN SERPL ELPH-MCNC: 4.5 G/DL
ALP BLD-CCNC: 61 U/L
ALT SERPL-CCNC: 13 U/L
ANION GAP SERPL CALC-SCNC: 14 MMOL/L
APPEARANCE: CLEAR
AST SERPL-CCNC: 14 U/L
BASOPHILS # BLD AUTO: 0.05 K/UL
BASOPHILS NFR BLD AUTO: 0.5 %
BILIRUB SERPL-MCNC: 0.2 MG/DL
BILIRUBIN URINE: NEGATIVE
BLOOD URINE: NEGATIVE
BUN SERPL-MCNC: 13 MG/DL
CALCIUM SERPL-MCNC: 9.7 MG/DL
CHLORIDE SERPL-SCNC: 105 MMOL/L
CHOLEST SERPL-MCNC: 179 MG/DL
CO2 SERPL-SCNC: 21 MMOL/L
COLOR: YELLOW
CREAT SERPL-MCNC: 0.7 MG/DL
EGFR: 126 ML/MIN/1.73M2
EOSINOPHIL # BLD AUTO: 0.31 K/UL
EOSINOPHIL NFR BLD AUTO: 3.3 %
ESTIMATED AVERAGE GLUCOSE: 100 MG/DL
FERRITIN SERPL-MCNC: 15 NG/ML
FOLATE SERPL-MCNC: 5.5 NG/ML
GLUCOSE QUALITATIVE U: NEGATIVE MG/DL
GLUCOSE SERPL-MCNC: 81 MG/DL
HBA1C MFR BLD HPLC: 5.1 %
HCT VFR BLD CALC: 42.3 %
HDLC SERPL-MCNC: 44 MG/DL
HGB BLD-MCNC: 12.9 G/DL
IMM GRANULOCYTES NFR BLD AUTO: 0.2 %
IRON SATN MFR SERPL: 14 %
IRON SERPL-MCNC: 52 UG/DL
KETONES URINE: NEGATIVE MG/DL
LDLC SERPL CALC-MCNC: 112 MG/DL
LEUKOCYTE ESTERASE URINE: NEGATIVE
LYMPHOCYTES # BLD AUTO: 2.89 K/UL
LYMPHOCYTES NFR BLD AUTO: 30.6 %
MAN DIFF?: NORMAL
MCHC RBC-ENTMCNC: 25.3 PG
MCHC RBC-ENTMCNC: 30.5 G/DL
MCV RBC AUTO: 82.9 FL
MONOCYTES # BLD AUTO: 0.48 K/UL
MONOCYTES NFR BLD AUTO: 5.1 %
NEUTROPHILS # BLD AUTO: 5.69 K/UL
NEUTROPHILS NFR BLD AUTO: 60.3 %
NITRITE URINE: NEGATIVE
NONHDLC SERPL-MCNC: 135 MG/DL
PH URINE: 6
PLATELET # BLD AUTO: 418 K/UL
POTASSIUM SERPL-SCNC: 4.7 MMOL/L
PROT SERPL-MCNC: 7.3 G/DL
PROTEIN URINE: NEGATIVE MG/DL
RBC # BLD: 5.1 M/UL
RBC # FLD: 14 %
SODIUM SERPL-SCNC: 140 MMOL/L
SPECIFIC GRAVITY URINE: 1.03
T3FREE SERPL-MCNC: 3.05 PG/ML
T4 FREE SERPL-MCNC: 1.2 NG/DL
TIBC SERPL-MCNC: 359 UG/DL
TRANSFERRIN SERPL-MCNC: 274 MG/DL
TRIGL SERPL-MCNC: 129 MG/DL
TSH SERPL-ACNC: 1.14 UIU/ML
UIBC SERPL-MCNC: 307 UG/DL
UROBILINOGEN URINE: 0.2 MG/DL
VIT B12 SERPL-MCNC: 676 PG/ML
WBC # FLD AUTO: 9.44 K/UL

## 2025-01-10 RX ORDER — ERGOCALCIFEROL 1.25 MG/1
1.25 MG CAPSULE ORAL
Qty: 10 | Refills: 0 | Status: ACTIVE | COMMUNITY
Start: 2025-01-10 | End: 1900-01-01

## 2025-01-16 ENCOUNTER — APPOINTMENT (OUTPATIENT)
Dept: OBGYN | Facility: CLINIC | Age: 20
End: 2025-01-16
Payer: COMMERCIAL

## 2025-01-16 VITALS
SYSTOLIC BLOOD PRESSURE: 116 MMHG | DIASTOLIC BLOOD PRESSURE: 74 MMHG | WEIGHT: 220 LBS | BODY MASS INDEX: 44.35 KG/M2 | HEIGHT: 59 IN

## 2025-01-16 DIAGNOSIS — Z01.419 ENCOUNTER FOR GYNECOLOGICAL EXAMINATION (GENERAL) (ROUTINE) W/OUT ABNORMAL FINDINGS: ICD-10-CM

## 2025-01-16 PROCEDURE — 99395 PREV VISIT EST AGE 18-39: CPT

## 2025-01-17 LAB
C TRACH RRNA SPEC QL NAA+PROBE: NOT DETECTED
N GONORRHOEA RRNA SPEC QL NAA+PROBE: NOT DETECTED
SOURCE TP AMPLIFICATION: NORMAL

## 2025-01-22 LAB — CYTOLOGY CVX/VAG DOC THIN PREP: NORMAL

## 2025-01-27 ENCOUNTER — APPOINTMENT (OUTPATIENT)
Dept: ENDOCRINOLOGY | Facility: CLINIC | Age: 20
End: 2025-01-27
Payer: COMMERCIAL

## 2025-01-27 VITALS
WEIGHT: 211 LBS | OXYGEN SATURATION: 99 % | HEART RATE: 87 BPM | HEIGHT: 59 IN | BODY MASS INDEX: 42.54 KG/M2 | SYSTOLIC BLOOD PRESSURE: 120 MMHG | DIASTOLIC BLOOD PRESSURE: 80 MMHG

## 2025-01-27 DIAGNOSIS — E04.9 NONTOXIC GOITER, UNSPECIFIED: ICD-10-CM

## 2025-01-27 PROCEDURE — 99204 OFFICE O/P NEW MOD 45 MIN: CPT

## 2025-01-28 ENCOUNTER — OUTPATIENT (OUTPATIENT)
Dept: OUTPATIENT SERVICES | Facility: HOSPITAL | Age: 20
LOS: 1 days | End: 2025-01-28
Payer: COMMERCIAL

## 2025-01-28 ENCOUNTER — APPOINTMENT (OUTPATIENT)
Dept: ULTRASOUND IMAGING | Facility: CLINIC | Age: 20
End: 2025-01-28

## 2025-01-28 PROCEDURE — 76536 US EXAM OF HEAD AND NECK: CPT | Mod: 26

## 2025-01-28 PROCEDURE — 76536 US EXAM OF HEAD AND NECK: CPT

## 2025-01-30 ENCOUNTER — TRANSCRIPTION ENCOUNTER (OUTPATIENT)
Age: 20
End: 2025-01-30

## 2025-01-30 RX ORDER — TIRZEPATIDE 10 MG/.5ML
10 INJECTION, SOLUTION SUBCUTANEOUS
Qty: 3 | Refills: 2 | Status: ACTIVE | COMMUNITY
Start: 2025-01-10 | End: 1900-01-01

## 2025-01-31 DIAGNOSIS — E66.812 OBESITY, CLASS 2: ICD-10-CM

## 2025-02-04 ENCOUNTER — TRANSCRIPTION ENCOUNTER (OUTPATIENT)
Age: 20
End: 2025-02-04

## 2025-02-05 ENCOUNTER — NON-APPOINTMENT (OUTPATIENT)
Age: 20
End: 2025-02-05

## 2025-02-05 DIAGNOSIS — R71.8 OTHER ABNORMALITY OF RED BLOOD CELLS: ICD-10-CM

## 2025-02-06 ENCOUNTER — NON-APPOINTMENT (OUTPATIENT)
Age: 20
End: 2025-02-06

## 2025-02-06 LAB
HGB A MFR BLD: 97.5 %
HGB A2 MFR BLD: 2.5 %
HGB FRACT BLD-IMP: NORMAL

## 2025-02-07 ENCOUNTER — TRANSCRIPTION ENCOUNTER (OUTPATIENT)
Age: 20
End: 2025-02-07

## 2025-02-07 LAB
ACTH SER-ACNC: 10.3 PG/ML
CORTIS SERPL-MCNC: 8.5 UG/DL
DHEA-S SERPL-MCNC: 215 UG/DL
IGF-1 INTERP: NORMAL
IGF-I BLD-MCNC: 310 NG/ML
MONOMERIC PROLACTIN (ICMA)*: 25.2 NG/ML
PERCENT MACROPROLACTIN: 16 %
PROLACTIN SERPL-MCNC: 27.5 NG/ML
PROLACTIN, SERUM (ICMA)*: 30.1 NG/ML

## 2025-02-28 ENCOUNTER — TRANSCRIPTION ENCOUNTER (OUTPATIENT)
Age: 20
End: 2025-02-28

## 2025-05-23 ENCOUNTER — TRANSCRIPTION ENCOUNTER (OUTPATIENT)
Age: 20
End: 2025-05-23

## 2025-05-23 DIAGNOSIS — R79.89 OTHER SPECIFIED ABNORMAL FINDINGS OF BLOOD CHEMISTRY: ICD-10-CM

## 2025-05-23 DIAGNOSIS — E66.812 OBESITY, CLASS 2: ICD-10-CM

## 2025-05-27 LAB
25(OH)D3 SERPL-MCNC: 28 NG/ML
ALBUMIN SERPL ELPH-MCNC: 4.5 G/DL
ALP BLD-CCNC: 50 U/L
ALT SERPL-CCNC: 15 U/L
ANION GAP SERPL CALC-SCNC: 16 MMOL/L
AST SERPL-CCNC: 20 U/L
BILIRUB SERPL-MCNC: 0.3 MG/DL
BUN SERPL-MCNC: 12 MG/DL
CALCIUM SERPL-MCNC: 10.1 MG/DL
CHLORIDE SERPL-SCNC: 103 MMOL/L
CHOLEST SERPL-MCNC: 166 MG/DL
CO2 SERPL-SCNC: 22 MMOL/L
CREAT SERPL-MCNC: 0.69 MG/DL
EGFRCR SERPLBLD CKD-EPI 2021: 127 ML/MIN/1.73M2
ESTIMATED AVERAGE GLUCOSE: 97 MG/DL
GLUCOSE SERPL-MCNC: 88 MG/DL
HBA1C MFR BLD HPLC: 5 %
HCT VFR BLD CALC: 41.7 %
HDLC SERPL-MCNC: 42 MG/DL
HGB BLD-MCNC: 13 G/DL
LDLC SERPL-MCNC: 104 MG/DL
MCHC RBC-ENTMCNC: 25.4 PG
MCHC RBC-ENTMCNC: 31.2 G/DL
MCV RBC AUTO: 81.6 FL
NONHDLC SERPL-MCNC: 124 MG/DL
PLATELET # BLD AUTO: 335 K/UL
POTASSIUM SERPL-SCNC: 4.1 MMOL/L
PROLACTIN SERPL-MCNC: 28.2 NG/ML
PROT SERPL-MCNC: 6.9 G/DL
RBC # BLD: 5.11 M/UL
RBC # FLD: 15.5 %
SODIUM SERPL-SCNC: 141 MMOL/L
TRIGL SERPL-MCNC: 106 MG/DL
WBC # FLD AUTO: 7.42 K/UL

## 2025-06-02 ENCOUNTER — APPOINTMENT (OUTPATIENT)
Dept: ENDOCRINOLOGY | Facility: CLINIC | Age: 20
End: 2025-06-02
Payer: COMMERCIAL

## 2025-06-02 DIAGNOSIS — R79.89 OTHER SPECIFIED ABNORMAL FINDINGS OF BLOOD CHEMISTRY: ICD-10-CM

## 2025-06-02 DIAGNOSIS — E66.812 OBESITY, CLASS 2: ICD-10-CM

## 2025-06-02 PROCEDURE — G2211 COMPLEX E/M VISIT ADD ON: CPT | Mod: NC,95

## 2025-06-02 PROCEDURE — 99214 OFFICE O/P EST MOD 30 MIN: CPT | Mod: 95

## 2025-07-14 ENCOUNTER — APPOINTMENT (OUTPATIENT)
Dept: CARDIOLOGY | Facility: CLINIC | Age: 20
End: 2025-07-14
Payer: COMMERCIAL

## 2025-07-14 VITALS
BODY MASS INDEX: 40.12 KG/M2 | DIASTOLIC BLOOD PRESSURE: 76 MMHG | OXYGEN SATURATION: 100 % | HEIGHT: 59 IN | SYSTOLIC BLOOD PRESSURE: 116 MMHG | WEIGHT: 199 LBS | HEART RATE: 71 BPM

## 2025-07-14 PROCEDURE — 93000 ELECTROCARDIOGRAM COMPLETE: CPT

## 2025-07-14 PROCEDURE — 99204 OFFICE O/P NEW MOD 45 MIN: CPT | Mod: 25

## 2025-07-14 PROCEDURE — 99401 PREV MED CNSL INDIV APPRX 15: CPT

## 2025-07-15 ENCOUNTER — TRANSCRIPTION ENCOUNTER (OUTPATIENT)
Age: 20
End: 2025-07-15

## 2025-07-21 ENCOUNTER — APPOINTMENT (OUTPATIENT)
Dept: PULMONOLOGY | Facility: CLINIC | Age: 20
End: 2025-07-21

## 2025-07-21 ENCOUNTER — APPOINTMENT (OUTPATIENT)
Dept: INTERNAL MEDICINE | Facility: CLINIC | Age: 20
End: 2025-07-21

## 2025-07-28 ENCOUNTER — APPOINTMENT (OUTPATIENT)
Dept: OBGYN | Facility: CLINIC | Age: 20
End: 2025-07-28
Payer: COMMERCIAL

## 2025-07-28 VITALS — DIASTOLIC BLOOD PRESSURE: 66 MMHG | SYSTOLIC BLOOD PRESSURE: 112 MMHG | WEIGHT: 199 LBS

## 2025-07-28 PROCEDURE — 36415 COLL VENOUS BLD VENIPUNCTURE: CPT

## 2025-07-28 PROCEDURE — 99214 OFFICE O/P EST MOD 30 MIN: CPT

## 2025-08-07 ENCOUNTER — TRANSCRIPTION ENCOUNTER (OUTPATIENT)
Age: 20
End: 2025-08-07

## 2025-08-11 ENCOUNTER — APPOINTMENT (OUTPATIENT)
Dept: OBGYN | Facility: CLINIC | Age: 20
End: 2025-08-11
Payer: COMMERCIAL

## 2025-08-11 DIAGNOSIS — Z71.83 ENCOUNTER FOR NONPROCREATIVE GENETIC COUNSELING: ICD-10-CM

## 2025-08-11 PROCEDURE — 36415 COLL VENOUS BLD VENIPUNCTURE: CPT

## 2025-08-11 PROCEDURE — 99212 OFFICE O/P EST SF 10 MIN: CPT

## 2025-09-15 ENCOUNTER — TRANSCRIPTION ENCOUNTER (OUTPATIENT)
Age: 20
End: 2025-09-15

## 2025-09-16 ENCOUNTER — TRANSCRIPTION ENCOUNTER (OUTPATIENT)
Age: 20
End: 2025-09-16